# Patient Record
Sex: MALE | Race: WHITE
[De-identification: names, ages, dates, MRNs, and addresses within clinical notes are randomized per-mention and may not be internally consistent; named-entity substitution may affect disease eponyms.]

---

## 2017-08-16 PROBLEM — Z00.00 ENCOUNTER FOR PREVENTIVE HEALTH EXAMINATION: Status: ACTIVE | Noted: 2017-08-16

## 2017-09-06 ENCOUNTER — APPOINTMENT (OUTPATIENT)
Dept: ORTHOPEDIC SURGERY | Facility: CLINIC | Age: 62
End: 2017-09-06

## 2024-04-23 ENCOUNTER — OUTPATIENT (OUTPATIENT)
Dept: OUTPATIENT SERVICES | Facility: HOSPITAL | Age: 69
LOS: 1 days | Discharge: HOME | End: 2024-04-23

## 2024-04-23 ENCOUNTER — TRANSCRIPTION ENCOUNTER (OUTPATIENT)
Age: 69
End: 2024-04-23

## 2024-04-23 DIAGNOSIS — C44.42 SQUAMOUS CELL CARCINOMA OF SKIN OF SCALP AND NECK: ICD-10-CM

## 2024-04-29 PROBLEM — Z86.39 HISTORY OF HYPERLIPIDEMIA: Status: RESOLVED | Noted: 2024-04-29 | Resolved: 2024-04-29

## 2024-04-29 RX ORDER — LEVOTHYROXINE SODIUM 75 UG/1
75 TABLET ORAL
Refills: 0 | Status: ACTIVE | COMMUNITY

## 2024-04-29 RX ORDER — FEXOFENADINE HYDROCHLORIDE 180 MG/1
180 TABLET ORAL
Refills: 0 | Status: ACTIVE | COMMUNITY

## 2024-04-29 RX ORDER — ASPIRIN 81 MG
81 TABLET, DELAYED RELEASE (ENTERIC COATED) ORAL
Refills: 0 | Status: ACTIVE | COMMUNITY

## 2024-04-29 RX ORDER — SIMVASTATIN 40 MG/1
40 TABLET, FILM COATED ORAL
Refills: 0 | Status: ACTIVE | COMMUNITY

## 2024-04-29 RX ORDER — FLUTICASONE PROPIONATE 50 MCG
SPRAY, SUSPENSION NASAL
Refills: 0 | Status: ACTIVE | COMMUNITY

## 2024-04-29 RX ORDER — OMEPRAZOLE MAGNESIUM 40 MG/1
40 CAPSULE, DELAYED RELEASE ORAL
Refills: 0 | Status: ACTIVE | COMMUNITY

## 2024-04-29 RX ORDER — MULTIVIT-MIN/IRON/FOLIC ACID/K 18-600-40
500 CAPSULE ORAL
Refills: 0 | Status: ACTIVE | COMMUNITY

## 2024-04-29 RX ORDER — AZELASTINE HYDROCHLORIDE 137 UG/1
137 SPRAY, METERED NASAL
Refills: 0 | Status: ACTIVE | COMMUNITY

## 2024-04-30 ENCOUNTER — OUTPATIENT (OUTPATIENT)
Dept: OUTPATIENT SERVICES | Facility: HOSPITAL | Age: 69
LOS: 1 days | Discharge: HOME | End: 2024-04-30
Payer: MEDICARE

## 2024-04-30 DIAGNOSIS — Z87.19 PERSONAL HISTORY OF OTHER DISEASES OF THE DIGESTIVE SYSTEM: ICD-10-CM

## 2024-04-30 DIAGNOSIS — Z86.39 PERSONAL HISTORY OF OTHER ENDOCRINE, NUTRITIONAL AND METABOLIC DISEASE: ICD-10-CM

## 2024-05-07 ENCOUNTER — APPOINTMENT (OUTPATIENT)
Dept: RADIATION ONCOLOGY | Facility: CLINIC | Age: 69
End: 2024-05-07
Payer: MEDICARE

## 2024-05-07 VITALS
HEART RATE: 68 BPM | RESPIRATION RATE: 16 BRPM | DIASTOLIC BLOOD PRESSURE: 85 MMHG | WEIGHT: 166 LBS | OXYGEN SATURATION: 96 % | SYSTOLIC BLOOD PRESSURE: 143 MMHG

## 2024-05-07 DIAGNOSIS — Z78.9 OTHER SPECIFIED HEALTH STATUS: ICD-10-CM

## 2024-05-07 PROCEDURE — G2211 COMPLEX E/M VISIT ADD ON: CPT

## 2024-05-07 PROCEDURE — 99205 OFFICE O/P NEW HI 60 MIN: CPT

## 2024-05-07 NOTE — PHYSICAL EXAM
[Sclera] : the sclera and conjunctiva were normal [Extraocular Movements] : extraocular movements were intact [Outer Ear] : the ears and nose were normal in appearance [Bowel Sounds] : normal bowel sounds [Abdomen Soft] : soft [Nondistended] : nondistended [Abdomen Tenderness] : non-tender [Cervical Lymph Nodes Enlarged Posterior Bilaterally] : posterior cervical [Cervical Lymph Nodes Enlarged Anterior Bilaterally] : anterior cervical [Supraclavicular Lymph Nodes Enlarged Bilaterally] : supraclavicular [Axillary Lymph Nodes Enlarged Bilaterally] : axillary [Skin Color & Pigmentation] : normal skin color and pigmentation [Normal] : no focal deficits [de-identified] : well healed scar left neck [de-identified] : well-healing surgical incision

## 2024-05-07 NOTE — REVIEW OF SYSTEMS
[Negative] : Allergic/Immunologic [Dysphagia: Grade 0] : Dysphagia: Grade 0 [Edema Limbs: Grade 0] : Edema Limbs: Grade 0  [Fatigue: Grade 0] : Fatigue: Grade 0 [Localized Edema: Grade 0] : Localized Edema: Grade 0  [Neck Edema: Grade 0] : Neck Edema: Grade 0 [Dysgeusia: Grade 0] : Dysgeusia: Grade 0 [Voice Alteration: Grade 0] : Voice Alteration: Grade 0

## 2024-05-07 NOTE — HISTORY OF PRESENT ILLNESS
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  He presented to Dr. Flo López.  2/8/24 thyroid ultrasound noted a left neck enlarged lymph node with thickened cortex, measuring 2.5 x1.6 x 2.2 cm.  2/23/24 ultrasound-guided FNA of the left neck lymph node showed squamous cell carcinoma, p16 negative.  Features of a lymphoproliferative disorder were not seen.  3/8/24 PET/CT showed an intensely FDG avid left level IIa lymph node (SUV 13; measuring 1.9 x 1.7 cm).  No other focal abnormal FDG activity was identified.  He met with Dr. Erich Stewart.  3/19/24 MRI orbit face again noted an enlarged level IIA lymph node, with no additional enlarged cervical lymph nodes identified.  There was asymmetric mildly prominent soft tissue in the left vallecula/tongue base, nonspecific.  3/19/24 CT chest showed a stable benign intrapulmonary nodule along the left proximal interlobar fissure.  3/21/24 biopsy of the posterior vertex scalp showed actinic keratosis.  3/26/24 PET/CT showed a hypermetabolic left internal jugular lymph node level 2, with asymmetric hypermetabolic activity in ill-defined 1 cm area of nodularity of the left base of tongue, suspicious for occult head and neck malignancy.  4/19/24 biopsy of the lingual surface of the epiglottis and left base of tongue were negative.  Excision of the left cervical lymph node showed poorly differentiated squamous cell carcinoma, focally keratinized with necrosis.  p53 focally positive; suggestive of wild type.  p16 positive.  He will meet with Dr. Philip Berman on 5/8/24.  Recovering well post-procedure.  Currently, no xerostomia, dysgeusia, odynophagia, dysphagia, otalgia, mastication issues, neck pain, fatigue or weight loss.

## 2024-05-07 NOTE — LETTER CLOSING
[Consult Closing:] : Thank you for allowing me to participate in the care of this patient.  If you have any questions, please do not hesitate to contact me. [Sincerely yours,] : Sincerely yours, [FreeTextEntry3] : Richard Wharton MD  of Radiation Medicine, Quality and  of Radiation Medicine [facility]

## 2024-05-07 NOTE — DISEASE MANAGEMENT
[Clinical] : TNM Stage: c [FreeTextEntry4] : H&N squamous cell carcinoma, unknown primary, p16 positive. [TTNM] : 0 [NTNM] : 1 [MTNM] : 0 [III] : III

## 2024-05-08 ENCOUNTER — APPOINTMENT (OUTPATIENT)
Dept: HEMATOLOGY ONCOLOGY | Facility: CLINIC | Age: 69
End: 2024-05-08
Payer: MEDICARE

## 2024-05-08 ENCOUNTER — RESULT REVIEW (OUTPATIENT)
Age: 69
End: 2024-05-08

## 2024-05-08 VITALS
HEART RATE: 62 BPM | DIASTOLIC BLOOD PRESSURE: 93 MMHG | OXYGEN SATURATION: 98 % | WEIGHT: 169 LBS | BODY MASS INDEX: 28.16 KG/M2 | SYSTOLIC BLOOD PRESSURE: 147 MMHG | TEMPERATURE: 97.7 F | HEIGHT: 65 IN

## 2024-05-08 LAB
BASOPHILS # BLD AUTO: 0.1 K/UL — SIGNIFICANT CHANGE UP (ref 0–0.2)
BASOPHILS NFR BLD AUTO: 0.9 % — SIGNIFICANT CHANGE UP (ref 0–2)
EOSINOPHIL # BLD AUTO: 0.1 K/UL — SIGNIFICANT CHANGE UP (ref 0–0.5)
EOSINOPHIL NFR BLD AUTO: 1.8 % — SIGNIFICANT CHANGE UP (ref 0–6)
HCT VFR BLD CALC: 40.1 % — SIGNIFICANT CHANGE UP (ref 39–50)
HGB BLD-MCNC: 14 G/DL — SIGNIFICANT CHANGE UP (ref 13–17)
LYMPHOCYTES # BLD AUTO: 2 K/UL — SIGNIFICANT CHANGE UP (ref 1–3.3)
LYMPHOCYTES # BLD AUTO: 33.8 % — SIGNIFICANT CHANGE UP (ref 13–44)
MCHC RBC-ENTMCNC: 31.1 PG — SIGNIFICANT CHANGE UP (ref 27–34)
MCHC RBC-ENTMCNC: 34.9 G/DL — SIGNIFICANT CHANGE UP (ref 32–36)
MCV RBC AUTO: 89.1 FL — SIGNIFICANT CHANGE UP (ref 80–100)
MONOCYTES # BLD AUTO: 0.4 K/UL — SIGNIFICANT CHANGE UP (ref 0–0.9)
MONOCYTES NFR BLD AUTO: 7 % — SIGNIFICANT CHANGE UP (ref 2–14)
NEUTROPHILS # BLD AUTO: 3.4 K/UL — SIGNIFICANT CHANGE UP (ref 1.8–7.4)
NEUTROPHILS NFR BLD AUTO: 56.5 % — SIGNIFICANT CHANGE UP (ref 43–77)
PLATELET # BLD AUTO: 267 K/UL — SIGNIFICANT CHANGE UP (ref 150–400)
RBC # BLD: 4.5 M/UL — SIGNIFICANT CHANGE UP (ref 4.2–5.8)
RBC # FLD: 11.6 % — SIGNIFICANT CHANGE UP (ref 10.3–14.5)
WBC # BLD: 6 K/UL — SIGNIFICANT CHANGE UP (ref 3.8–10.5)
WBC # FLD AUTO: 6 K/UL — SIGNIFICANT CHANGE UP (ref 3.8–10.5)

## 2024-05-08 PROCEDURE — 99204 OFFICE O/P NEW MOD 45 MIN: CPT

## 2024-05-09 LAB
ALBUMIN SERPL ELPH-MCNC: 4.6 G/DL
ALP BLD-CCNC: 89 U/L
ALT SERPL-CCNC: 17 U/L
ANION GAP SERPL CALC-SCNC: 12 MMOL/L
APTT BLD: 30 SEC
AST SERPL-CCNC: 17 U/L
BILIRUB SERPL-MCNC: 0.4 MG/DL
BUN SERPL-MCNC: 21 MG/DL
CALCIUM SERPL-MCNC: 10.1 MG/DL
CHLORIDE SERPL-SCNC: 106 MMOL/L
CO2 SERPL-SCNC: 23 MMOL/L
CREAT SERPL-MCNC: 1.08 MG/DL
EGFR: 74 ML/MIN/1.73M2
GLUCOSE SERPL-MCNC: 105 MG/DL
HBV CORE IGG+IGM SER QL: NONREACTIVE
HBV SURFACE AB SER QL: NONREACTIVE
HBV SURFACE AG SER QL: NONREACTIVE
HCV AB SER QL: NONREACTIVE
HCV S/CO RATIO: 0.07 S/CO
INR PPP: 0.86 RATIO
MAGNESIUM SERPL-MCNC: 2.1 MG/DL
POTASSIUM SERPL-SCNC: 4.3 MMOL/L
PROT SERPL-MCNC: 6.8 G/DL
PT BLD: 9.8 SEC
SODIUM SERPL-SCNC: 141 MMOL/L

## 2024-05-09 NOTE — HISTORY OF PRESENT ILLNESS
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  He presented to Dr. Flo López.  2/8/24 thyroid ultrasound noted a left neck enlarged lymph node with thickened cortex, measuring 2.5 x 1.6 x 2.2 cm.  2/23/24 ultrasound-guided FNA of the left neck lymph node showed squamous cell carcinoma, p16 negative. Features of a lymphoproliferative disorder were not seen.  3/8/24 PET/CT showed an intensely FDG avid left level IIa lymph node (SUV 13; measuring 1.9 x 1.7 cm). No other focal abnormal FDG activity was identified.  He met with Dr. Erich Stewart.  3/19/24 MRI orbit face again noted an enlarged level IIA lymph node, with no additional enlarged cervical lymph nodes identified. There was asymmetric mildly prominent soft tissue in the left vallecula/tongue base, nonspecific.  3/19/24 CT chest showed a stable benign intrapulmonary nodule along the left proximal interlobar fissure.  3/21/24 biopsy of the posterior vertex scalp showed actinic keratosis.  3/26/24 PET/CT showed a hypermetabolic left internal jugular lymph node level 2, with asymmetric hypermetabolic activity in ill-defined 1 cm area of nodularity of the left base of tongue, suspicious for occult head and neck malignancy.  4/19/24 biopsy of the lingual surface of the epiglottis and left base of tongue were negative. Excision of the left cervical lymph node showed poorly differentiated squamous cell carcinoma, focally keratinized with necrosis. p53 focally positive; suggestive of wild type. p16 positive.    Patient presents for initial visit with wife Pt reports feeling well today. Denies symptoms, swallowing fine, denies pain Leading up to diagnosis, reported some trouble swallowing Denies recent weight loss Currently takes Synthroid for hypothyroidism Denies h/o kidney dysfunction Wife reports that pt has been hospitalized in the past for dehydration Wife is concerned about side effects related to treatment

## 2024-05-09 NOTE — ADDENDUM
[FreeTextEntry1] : Documented by Ramana Downing acting as scribe for Dr. Berman on  05/08/2024.   All Medical record entries made by the Scribe were at my, Dr. Berman's, direction and personally dictated by me on  05/08/2024. I have reviewed the chart and agree that the record accurately reflects my personal performance of the history, physical exam, assessment and plan. I have also personally directed, reviewed, and agreed with the discharge instructions.

## 2024-05-09 NOTE — ASSESSMENT
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 (T0N1M0) p16+  Today we discussed the available radiographic and pathologic data in detail. We also discussed the natural history of the disease, as well as management options.  Discussed treatment with concurrent chemo/RT with cisplatin 40 mg/m2 once a week for 6 cycles, as well as the side effect profile. Discussed that the goal of treatment is curative.  Chemo consent form signed.  Plan -Start concurrent chemo/RT  -Continue f/u with Dr. Akiko MCKEON after tx has started

## 2024-05-10 ENCOUNTER — NON-APPOINTMENT (OUTPATIENT)
Age: 69
End: 2024-05-10

## 2024-05-13 ENCOUNTER — TRANSCRIPTION ENCOUNTER (OUTPATIENT)
Age: 69
End: 2024-05-13

## 2024-05-14 ENCOUNTER — NON-APPOINTMENT (OUTPATIENT)
Age: 69
End: 2024-05-14

## 2024-05-14 DIAGNOSIS — Z86.39 PERSONAL HISTORY OF OTHER ENDOCRINE, NUTRITIONAL AND METABOLIC DISEASE: ICD-10-CM

## 2024-05-15 ENCOUNTER — NON-APPOINTMENT (OUTPATIENT)
Age: 69
End: 2024-05-15

## 2024-05-20 DIAGNOSIS — C76.0 MALIGNANT NEOPLASM OF HEAD, FACE AND NECK: ICD-10-CM

## 2024-05-28 ENCOUNTER — APPOINTMENT (OUTPATIENT)
Dept: RADIATION ONCOLOGY | Facility: CLINIC | Age: 69
End: 2024-05-28
Payer: MEDICARE

## 2024-05-28 VITALS
RESPIRATION RATE: 16 BRPM | BODY MASS INDEX: 28.12 KG/M2 | OXYGEN SATURATION: 97 % | WEIGHT: 169 LBS | SYSTOLIC BLOOD PRESSURE: 135 MMHG | DIASTOLIC BLOOD PRESSURE: 82 MMHG | HEART RATE: 93 BPM

## 2024-05-28 PROCEDURE — 77470 SPECIAL RADIATION TREATMENT: CPT | Mod: 26

## 2024-05-28 PROCEDURE — G2211 COMPLEX E/M VISIT ADD ON: CPT

## 2024-05-28 PROCEDURE — 77334 RADIATION TREATMENT AID(S): CPT | Mod: 26

## 2024-05-28 PROCEDURE — 77263 THER RADIOLOGY TX PLNG CPLX: CPT

## 2024-05-28 PROCEDURE — 99214 OFFICE O/P EST MOD 30 MIN: CPT | Mod: 25

## 2024-05-29 NOTE — VITALS
[Maximal Pain Intensity: 0/10] : 0/10 [Least Pain Intensity: 0/10] : 0/10 [NoTreatment Scheduled] : no treatment scheduled [ECOG Performance Status: 1 - Restricted in physically strenuous activity but ambulatory and able to carry out work of a light or sedentary nature] : Performance Status: 1 - Restricted in physically strenuous activity but ambulatory and able to carry out work of a light or sedentary nature, e.g., light house work, office work [90: Able to carry normal activity; minor signs or symptoms of disease.] : 90: Able to carry normal activity; minor signs or symptoms of disease.

## 2024-05-29 NOTE — HISTORY OF PRESENT ILLNESS
[FreeTextEntry1] : 68-year-old man presents today for follow-up and to discuss concurrent chemo radiation therapy.  He is accompanied by his wife Leighann.  He is diagnosed with squamous cell carcinoma of a left cervical lymph node of unknown primary.  2/8/24 thyroid ultrasound noted a left neck enlarged lymph node with thickened cortex, measuring 2.5 x1.6 x 2.2 cm.  2/23/24 ultrasound-guided FNA of the left neck lymph node showed squamous cell carcinoma, p16 negative. Features of a lymphoproliferative disorder were not seen.  3/8/24 PET/CT showed an intensely FDG avid left level IIa lymph node (SUV 13; measuring 1.9 x 1.7 cm). No other focal abnormal FDG activity was identified.  He met with Dr. Erich Stewart.  3/19/24 MRI orbit face again noted an enlarged level IIA lymph node, with no additional enlarged cervical lymph nodes identified. There was asymmetric mildly prominent soft tissue in the left vallecula/tongue base, nonspecific.  3/19/24 CT chest showed a stable benign intrapulmonary nodule along the left proximal interlobar fissure.  3/26/24 PET/CT showed a hypermetabolic left internal jugular lymph node level 2, with asymmetric hypermetabolic activity in ill-defined 1 cm area of nodularity of the left base of tongue, suspicious for occult head and neck malignancy.  4/19/24 biopsy of the lingual surface of the epiglottis and left base of tongue were negative. Excision of the left cervical lymph node showed poorly differentiated squamous cell carcinoma, focally keratinized with necrosis. p53 focally positive; suggestive of wild type. p16 positive.  He met with Dr. Philip Berman on 5/8/24.  He also recently saw the dentist, and underwent dental implant of a front upper tooth.  Mr. Celeste denies xerostomia, dysgeusia, odynophagia, dysphagia, otalgia, mastication issues, neck pain, fatigue or weight loss.

## 2024-06-05 ENCOUNTER — OUTPATIENT (OUTPATIENT)
Dept: OUTPATIENT SERVICES | Facility: HOSPITAL | Age: 69
LOS: 1 days | End: 2024-06-05
Payer: MEDICARE

## 2024-06-05 ENCOUNTER — APPOINTMENT (OUTPATIENT)
Dept: NUCLEAR MEDICINE | Facility: CLINIC | Age: 69
End: 2024-06-05

## 2024-06-05 DIAGNOSIS — C79.89 SECONDARY MALIGNANT NEOPLASM OF OTHER SPECIFIED SITES: ICD-10-CM

## 2024-06-05 PROCEDURE — 78815 PET IMAGE W/CT SKULL-THIGH: CPT | Mod: 26,PS

## 2024-06-06 PROCEDURE — 77290 THER RAD SIMULAJ FIELD CPLX: CPT | Mod: 26

## 2024-06-07 PROCEDURE — 77338 DESIGN MLC DEVICE FOR IMRT: CPT | Mod: 26

## 2024-06-07 PROCEDURE — 77301 RADIOTHERAPY DOSE PLAN IMRT: CPT | Mod: 26

## 2024-06-07 PROCEDURE — 77300 RADIATION THERAPY DOSE PLAN: CPT | Mod: 26

## 2024-06-11 RX ORDER — PROCHLORPERAZINE MALEATE 10 MG/1
10 TABLET ORAL
Qty: 120 | Refills: 2 | Status: ACTIVE | COMMUNITY
Start: 2024-06-11 | End: 1900-01-01

## 2024-06-11 RX ORDER — ONDANSETRON 8 MG/1
8 TABLET ORAL
Qty: 90 | Refills: 2 | Status: ACTIVE | COMMUNITY
Start: 2024-06-11 | End: 1900-01-01

## 2024-06-13 ENCOUNTER — RESULT REVIEW (OUTPATIENT)
Age: 69
End: 2024-06-13

## 2024-06-13 ENCOUNTER — APPOINTMENT (OUTPATIENT)
Dept: HEMATOLOGY ONCOLOGY | Facility: CLINIC | Age: 69
End: 2024-06-13

## 2024-06-13 LAB
BASOPHILS # BLD AUTO: 0 K/UL — SIGNIFICANT CHANGE UP (ref 0–0.2)
BASOPHILS NFR BLD AUTO: 0.7 % — SIGNIFICANT CHANGE UP (ref 0–2)
EOSINOPHIL # BLD AUTO: 0.1 K/UL — SIGNIFICANT CHANGE UP (ref 0–0.5)
EOSINOPHIL NFR BLD AUTO: 2.3 % — SIGNIFICANT CHANGE UP (ref 0–6)
HCT VFR BLD CALC: 39.7 % — SIGNIFICANT CHANGE UP (ref 39–50)
HGB BLD-MCNC: 13.8 G/DL — SIGNIFICANT CHANGE UP (ref 13–17)
LYMPHOCYTES # BLD AUTO: 1.7 K/UL — SIGNIFICANT CHANGE UP (ref 1–3.3)
LYMPHOCYTES # BLD AUTO: 30.1 % — SIGNIFICANT CHANGE UP (ref 13–44)
MCHC RBC-ENTMCNC: 31 PG — SIGNIFICANT CHANGE UP (ref 27–34)
MCHC RBC-ENTMCNC: 34.9 G/DL — SIGNIFICANT CHANGE UP (ref 32–36)
MCV RBC AUTO: 88.8 FL — SIGNIFICANT CHANGE UP (ref 80–100)
MONOCYTES # BLD AUTO: 0.3 K/UL — SIGNIFICANT CHANGE UP (ref 0–0.9)
MONOCYTES NFR BLD AUTO: 6 % — SIGNIFICANT CHANGE UP (ref 2–14)
NEUTROPHILS # BLD AUTO: 3.3 K/UL — SIGNIFICANT CHANGE UP (ref 1.8–7.4)
NEUTROPHILS NFR BLD AUTO: 60.9 % — SIGNIFICANT CHANGE UP (ref 43–77)
PLATELET # BLD AUTO: 236 K/UL — SIGNIFICANT CHANGE UP (ref 150–400)
RBC # BLD: 4.47 M/UL — SIGNIFICANT CHANGE UP (ref 4.2–5.8)
RBC # FLD: 11.1 % — SIGNIFICANT CHANGE UP (ref 10.3–14.5)
WBC # BLD: 5.5 K/UL — SIGNIFICANT CHANGE UP (ref 3.8–10.5)
WBC # FLD AUTO: 5.5 K/UL — SIGNIFICANT CHANGE UP (ref 3.8–10.5)

## 2024-06-14 ENCOUNTER — LABORATORY RESULT (OUTPATIENT)
Age: 69
End: 2024-06-14

## 2024-06-14 ENCOUNTER — APPOINTMENT (OUTPATIENT)
Age: 69
End: 2024-06-14

## 2024-06-14 LAB
ALBUMIN SERPL ELPH-MCNC: 4.3 G/DL
ALP BLD-CCNC: 74 U/L
ALT SERPL-CCNC: 17 U/L
ANION GAP SERPL CALC-SCNC: 11 MMOL/L
AST SERPL-CCNC: 17 U/L
BILIRUB SERPL-MCNC: 0.4 MG/DL
BUN SERPL-MCNC: 22 MG/DL
CALCIUM SERPL-MCNC: 9.9 MG/DL
CHLORIDE SERPL-SCNC: 108 MMOL/L
CO2 SERPL-SCNC: 24 MMOL/L
CREAT SERPL-MCNC: 1.39 MG/DL
EGFR: 55 ML/MIN/1.73M2
GLUCOSE SERPL-MCNC: 140 MG/DL
MAGNESIUM SERPL-MCNC: 2 MG/DL
POTASSIUM SERPL-SCNC: 4 MMOL/L
PROT SERPL-MCNC: 6.5 G/DL
SODIUM SERPL-SCNC: 143 MMOL/L

## 2024-06-14 PROCEDURE — 77387B: CUSTOM | Mod: 26

## 2024-06-14 PROCEDURE — 77427 RADIATION TX MANAGEMENT X5: CPT

## 2024-06-16 ENCOUNTER — NON-APPOINTMENT (OUTPATIENT)
Age: 69
End: 2024-06-16

## 2024-06-17 DIAGNOSIS — Z51.11 ENCOUNTER FOR ANTINEOPLASTIC CHEMOTHERAPY: ICD-10-CM

## 2024-06-17 DIAGNOSIS — E86.0 DEHYDRATION: ICD-10-CM

## 2024-06-17 DIAGNOSIS — R11.2 NAUSEA WITH VOMITING, UNSPECIFIED: ICD-10-CM

## 2024-06-17 PROCEDURE — 77387B: CUSTOM | Mod: 26

## 2024-06-18 PROCEDURE — 77387B: CUSTOM | Mod: 26

## 2024-06-19 PROCEDURE — 77387B: CUSTOM | Mod: 26

## 2024-06-20 ENCOUNTER — NON-APPOINTMENT (OUTPATIENT)
Age: 69
End: 2024-06-20

## 2024-06-20 ENCOUNTER — APPOINTMENT (OUTPATIENT)
Dept: HEMATOLOGY ONCOLOGY | Facility: CLINIC | Age: 69
End: 2024-06-20

## 2024-06-20 ENCOUNTER — RESULT REVIEW (OUTPATIENT)
Age: 69
End: 2024-06-20

## 2024-06-20 VITALS
RESPIRATION RATE: 16 BRPM | HEART RATE: 63 BPM | SYSTOLIC BLOOD PRESSURE: 143 MMHG | DIASTOLIC BLOOD PRESSURE: 83 MMHG | BODY MASS INDEX: 27.99 KG/M2 | OXYGEN SATURATION: 98 % | WEIGHT: 168 LBS | HEIGHT: 65 IN

## 2024-06-20 LAB
BASOPHILS # BLD AUTO: 0 K/UL — SIGNIFICANT CHANGE UP (ref 0–0.2)
BASOPHILS NFR BLD AUTO: 0.7 % — SIGNIFICANT CHANGE UP (ref 0–2)
EOSINOPHIL # BLD AUTO: 0.2 K/UL — SIGNIFICANT CHANGE UP (ref 0–0.5)
EOSINOPHIL NFR BLD AUTO: 2.4 % — SIGNIFICANT CHANGE UP (ref 0–6)
HCT VFR BLD CALC: 41.5 % — SIGNIFICANT CHANGE UP (ref 39–50)
HGB BLD-MCNC: 14.5 G/DL — SIGNIFICANT CHANGE UP (ref 13–17)
LYMPHOCYTES # BLD AUTO: 1.6 K/UL — SIGNIFICANT CHANGE UP (ref 1–3.3)
LYMPHOCYTES # BLD AUTO: 22.6 % — SIGNIFICANT CHANGE UP (ref 13–44)
MCHC RBC-ENTMCNC: 30.8 PG — SIGNIFICANT CHANGE UP (ref 27–34)
MCHC RBC-ENTMCNC: 34.9 G/DL — SIGNIFICANT CHANGE UP (ref 32–36)
MCV RBC AUTO: 88.4 FL — SIGNIFICANT CHANGE UP (ref 80–100)
MONOCYTES # BLD AUTO: 0.4 K/UL — SIGNIFICANT CHANGE UP (ref 0–0.9)
MONOCYTES NFR BLD AUTO: 6.4 % — SIGNIFICANT CHANGE UP (ref 2–14)
NEUTROPHILS # BLD AUTO: 4.8 K/UL — SIGNIFICANT CHANGE UP (ref 1.8–7.4)
NEUTROPHILS NFR BLD AUTO: 68 % — SIGNIFICANT CHANGE UP (ref 43–77)
PLATELET # BLD AUTO: 261 K/UL — SIGNIFICANT CHANGE UP (ref 150–400)
RBC # BLD: 4.7 M/UL — SIGNIFICANT CHANGE UP (ref 4.2–5.8)
RBC # FLD: 10.9 % — SIGNIFICANT CHANGE UP (ref 10.3–14.5)
WBC # BLD: 7 K/UL — SIGNIFICANT CHANGE UP (ref 3.8–10.5)
WBC # FLD AUTO: 7 K/UL — SIGNIFICANT CHANGE UP (ref 3.8–10.5)

## 2024-06-20 PROCEDURE — 77014: CPT | Mod: 26

## 2024-06-20 NOTE — REVIEW OF SYSTEMS
[Dysphagia: Grade 0] : Dysphagia: Grade 0 [Edema Limbs: Grade 0] : Edema Limbs: Grade 0  [Fatigue: Grade 0] : Fatigue: Grade 0 [Localized Edema: Grade 0] : Localized Edema: Grade 0  [Neck Edema: Grade 0] : Neck Edema: Grade 0 [Mucositis Oral: Grade 0] : Mucositis Oral: Grade 0  [Xerostomia: Grade 0] : Xerostomia: Grade 0 [Oral Pain: Grade 0] : Oral Pain: Grade 0 [Dysgeusia: Grade 0] : Dysgeusia: Grade 0 [Hoarseness: Grade 0] : Hoarseness: Grade 0 [Voice Alteration: Grade 0] : Voice Alteration: Grade 0 [Skin Hyperpigmentation: Grade 0] : Skin Hyperpigmentation: Grade 0 [Dermatitis Radiation: Grade 0] : Dermatitis Radiation: Grade 0

## 2024-06-20 NOTE — DISEASE MANAGEMENT
[Clinical] : TNM Stage: c [III] : III [FreeTextEntry4] : H&N squamous cell carcinoma, unknown primary, p16 positive. [TTNM] : 0 [NTNM] : 1 [MTNM] : 0 [de-identified] : 1000 [de-identified] : 4339 [de-identified] : pharynx

## 2024-06-21 ENCOUNTER — APPOINTMENT (OUTPATIENT)
Age: 69
End: 2024-06-21

## 2024-06-21 ENCOUNTER — OUTPATIENT (OUTPATIENT)
Dept: OUTPATIENT SERVICES | Facility: HOSPITAL | Age: 69
LOS: 1 days | Discharge: HOME | End: 2024-06-21

## 2024-06-21 DIAGNOSIS — C44.42 SQUAMOUS CELL CARCINOMA OF SKIN OF SCALP AND NECK: ICD-10-CM

## 2024-06-21 LAB
ALBUMIN SERPL ELPH-MCNC: 4.4 G/DL
ALP BLD-CCNC: 74 U/L
ALT SERPL-CCNC: 20 U/L
ANION GAP SERPL CALC-SCNC: 13 MMOL/L
AST SERPL-CCNC: 18 U/L
BILIRUB SERPL-MCNC: 0.5 MG/DL
BUN SERPL-MCNC: 20 MG/DL
CALCIUM SERPL-MCNC: 10.4 MG/DL
CHLORIDE SERPL-SCNC: 104 MMOL/L
CO2 SERPL-SCNC: 22 MMOL/L
CREAT SERPL-MCNC: 0.96 MG/DL
EGFR: 84 ML/MIN/1.73M2
GLUCOSE SERPL-MCNC: 152 MG/DL
POTASSIUM SERPL-SCNC: 4.2 MMOL/L
PROT SERPL-MCNC: 6.6 G/DL
SODIUM SERPL-SCNC: 139 MMOL/L

## 2024-06-21 PROCEDURE — 77427 RADIATION TX MANAGEMENT X5: CPT

## 2024-06-21 PROCEDURE — 77387B: CUSTOM | Mod: 26

## 2024-06-24 DIAGNOSIS — Z51.11 ENCOUNTER FOR ANTINEOPLASTIC CHEMOTHERAPY: ICD-10-CM

## 2024-06-24 DIAGNOSIS — R11.2 NAUSEA WITH VOMITING, UNSPECIFIED: ICD-10-CM

## 2024-06-24 PROCEDURE — 77387B: CUSTOM | Mod: 26

## 2024-06-25 PROCEDURE — 77387B: CUSTOM | Mod: 26

## 2024-06-26 ENCOUNTER — RESULT REVIEW (OUTPATIENT)
Age: 69
End: 2024-06-26

## 2024-06-26 ENCOUNTER — APPOINTMENT (OUTPATIENT)
Dept: HEMATOLOGY ONCOLOGY | Facility: CLINIC | Age: 69
End: 2024-06-26

## 2024-06-26 ENCOUNTER — APPOINTMENT (OUTPATIENT)
Dept: HEMATOLOGY ONCOLOGY | Facility: CLINIC | Age: 69
End: 2024-06-26
Payer: MEDICARE

## 2024-06-26 VITALS
HEART RATE: 65 BPM | HEIGHT: 65 IN | SYSTOLIC BLOOD PRESSURE: 126 MMHG | BODY MASS INDEX: 27.83 KG/M2 | DIASTOLIC BLOOD PRESSURE: 80 MMHG | OXYGEN SATURATION: 97 % | WEIGHT: 167.06 LBS

## 2024-06-26 DIAGNOSIS — C80.1 SECONDARY MALIGNANT NEOPLASM OF OTHER SPECIFIED SITES: ICD-10-CM

## 2024-06-26 DIAGNOSIS — C79.89 SECONDARY MALIGNANT NEOPLASM OF OTHER SPECIFIED SITES: ICD-10-CM

## 2024-06-26 LAB
BASOPHILS # BLD AUTO: 0 K/UL — SIGNIFICANT CHANGE UP (ref 0–0.2)
BASOPHILS NFR BLD AUTO: 0.4 % — SIGNIFICANT CHANGE UP (ref 0–2)
EOSINOPHIL # BLD AUTO: 0.1 K/UL — SIGNIFICANT CHANGE UP (ref 0–0.5)
EOSINOPHIL NFR BLD AUTO: 1.5 % — SIGNIFICANT CHANGE UP (ref 0–6)
HCT VFR BLD CALC: 39.5 % — SIGNIFICANT CHANGE UP (ref 39–50)
HGB BLD-MCNC: 13.8 G/DL — SIGNIFICANT CHANGE UP (ref 13–17)
LYMPHOCYTES # BLD AUTO: 1.1 K/UL — SIGNIFICANT CHANGE UP (ref 1–3.3)
LYMPHOCYTES # BLD AUTO: 17 % — SIGNIFICANT CHANGE UP (ref 13–44)
MCHC RBC-ENTMCNC: 31.2 PG — SIGNIFICANT CHANGE UP (ref 27–34)
MCHC RBC-ENTMCNC: 34.8 G/DL — SIGNIFICANT CHANGE UP (ref 32–36)
MCV RBC AUTO: 89.6 FL — SIGNIFICANT CHANGE UP (ref 80–100)
MONOCYTES # BLD AUTO: 0.5 K/UL — SIGNIFICANT CHANGE UP (ref 0–0.9)
MONOCYTES NFR BLD AUTO: 8.7 % — SIGNIFICANT CHANGE UP (ref 2–14)
NEUTROPHILS # BLD AUTO: 4.5 K/UL — SIGNIFICANT CHANGE UP (ref 1.8–7.4)
NEUTROPHILS NFR BLD AUTO: 72.4 % — SIGNIFICANT CHANGE UP (ref 43–77)
PLATELET # BLD AUTO: 225 K/UL — SIGNIFICANT CHANGE UP (ref 150–400)
RBC # BLD: 4.41 M/UL — SIGNIFICANT CHANGE UP (ref 4.2–5.8)
RBC # FLD: 11.5 % — SIGNIFICANT CHANGE UP (ref 10.3–14.5)
WBC # BLD: 6.2 K/UL — SIGNIFICANT CHANGE UP (ref 3.8–10.5)
WBC # FLD AUTO: 6.2 K/UL — SIGNIFICANT CHANGE UP (ref 3.8–10.5)

## 2024-06-26 PROCEDURE — 99214 OFFICE O/P EST MOD 30 MIN: CPT

## 2024-06-26 PROCEDURE — 77387B: CUSTOM | Mod: 26

## 2024-06-27 ENCOUNTER — NON-APPOINTMENT (OUTPATIENT)
Age: 69
End: 2024-06-27

## 2024-06-27 VITALS
RESPIRATION RATE: 16 BRPM | SYSTOLIC BLOOD PRESSURE: 127 MMHG | OXYGEN SATURATION: 96 % | HEART RATE: 90 BPM | TEMPERATURE: 97 F | WEIGHT: 168 LBS | BODY MASS INDEX: 27.99 KG/M2 | DIASTOLIC BLOOD PRESSURE: 77 MMHG | HEIGHT: 65 IN

## 2024-06-27 DIAGNOSIS — R07.0 PAIN IN THROAT: ICD-10-CM

## 2024-06-27 LAB
ALBUMIN SERPL ELPH-MCNC: 4.4 G/DL
ALP BLD-CCNC: 78 U/L
ALT SERPL-CCNC: 22 U/L
ANION GAP SERPL CALC-SCNC: 11 MMOL/L
AST SERPL-CCNC: 16 U/L
BILIRUB SERPL-MCNC: 0.3 MG/DL
BUN SERPL-MCNC: 25 MG/DL
CALCIUM SERPL-MCNC: 10.1 MG/DL
CHLORIDE SERPL-SCNC: 104 MMOL/L
CO2 SERPL-SCNC: 25 MMOL/L
CREAT SERPL-MCNC: 1.05 MG/DL
EGFR: 76 ML/MIN/1.73M2
GLUCOSE SERPL-MCNC: 84 MG/DL
POTASSIUM SERPL-SCNC: 4.6 MMOL/L
PROT SERPL-MCNC: 6.7 G/DL
SODIUM SERPL-SCNC: 140 MMOL/L

## 2024-06-27 PROCEDURE — 77014: CPT | Mod: 26

## 2024-06-27 NOTE — DISEASE MANAGEMENT
[Clinical] : TNM Stage: c [III] : III [FreeTextEntry4] : H&N squamous cell carcinoma, unknown primary, p16 positive. [TTNM] : 0 [NTNM] : 1 [MTNM] : 0 [de-identified] : 2000 [de-identified] : 4455 [de-identified] : pharynx

## 2024-06-27 NOTE — VITALS
[Maximal Pain Intensity: 0/10] : 0/10 [Least Pain Intensity: 0/10] : 0/10 [Maximal Pain Intensity: 1/10] : 1/10 [Least Pain Intensity: 1/10] : 1/10 [Pain Description/Quality: ___] : Pain description/quality: [unfilled] [Pain Duration: ___] : Pain duration: [unfilled] [Pain Location: ___] : Pain Location: [unfilled] [NoTreatment Scheduled] : no treatment scheduled [Pain Interferes with ADLs] : Pain does not interfere with activities of daily living [90: Able to carry normal activity; minor signs or symptoms of disease.] : 90: Able to carry normal activity; minor signs or symptoms of disease.

## 2024-06-27 NOTE — REVIEW OF SYSTEMS
[Dysphagia: Grade 0] : Dysphagia: Grade 0 [Edema Limbs: Grade 0] : Edema Limbs: Grade 0  [Fatigue: Grade 0] : Fatigue: Grade 0 [Localized Edema: Grade 0] : Localized Edema: Grade 0  [Neck Edema: Grade 0] : Neck Edema: Grade 0 [Mucositis Oral: Grade 0] : Mucositis Oral: Grade 0  [Xerostomia: Grade 0] : Xerostomia: Grade 0 [Oral Pain: Grade 0] : Oral Pain: Grade 0 [Dysgeusia: Grade 0] : Dysgeusia: Grade 0 [Hoarseness: Grade 0] : Hoarseness: Grade 0 [Voice Alteration: Grade 0] : Voice Alteration: Grade 0 [Skin Hyperpigmentation: Grade 0] : Skin Hyperpigmentation: Grade 0 [Dermatitis Radiation: Grade 0] : Dermatitis Radiation: Grade 0 [Fatigue: Grade 1 - Fatigue relieved by rest] : Fatigue: Grade 1 - Fatigue relieved by rest [Dysgeusia: Grade 1- Altered taste but no change in diet] : Dysgeusia: Grade 1 - Altered taste but no change in diet

## 2024-06-28 ENCOUNTER — APPOINTMENT (OUTPATIENT)
Age: 69
End: 2024-06-28

## 2024-06-28 PROCEDURE — 77387B: CUSTOM | Mod: 26

## 2024-07-01 DIAGNOSIS — E86.0 DEHYDRATION: ICD-10-CM

## 2024-07-01 PROCEDURE — 77387B: CUSTOM | Mod: 26

## 2024-07-01 PROCEDURE — 77427 RADIATION TX MANAGEMENT X5: CPT

## 2024-07-02 PROCEDURE — 77387B: CUSTOM | Mod: 26

## 2024-07-03 ENCOUNTER — APPOINTMENT (OUTPATIENT)
Dept: HEMATOLOGY ONCOLOGY | Facility: CLINIC | Age: 69
End: 2024-07-03

## 2024-07-03 ENCOUNTER — RESULT REVIEW (OUTPATIENT)
Age: 69
End: 2024-07-03

## 2024-07-03 LAB
ALBUMIN SERPL ELPH-MCNC: 4.2 G/DL
ALP BLD-CCNC: 73 U/L
ALT SERPL-CCNC: 14 U/L
ANION GAP SERPL CALC-SCNC: 14 MMOL/L
AST SERPL-CCNC: 13 U/L
BASOPHILS # BLD AUTO: 0.1 K/UL — SIGNIFICANT CHANGE UP (ref 0–0.2)
BASOPHILS NFR BLD AUTO: 1.3 % — SIGNIFICANT CHANGE UP (ref 0–2)
BILIRUB SERPL-MCNC: 0.3 MG/DL
BUN SERPL-MCNC: 25 MG/DL
CALCIUM SERPL-MCNC: 10.1 MG/DL
CHLORIDE SERPL-SCNC: 104 MMOL/L
CO2 SERPL-SCNC: 26 MMOL/L
CREAT SERPL-MCNC: 1.07 MG/DL
EGFR: 74 ML/MIN/1.73M2
EOSINOPHIL # BLD AUTO: 0.1 K/UL — SIGNIFICANT CHANGE UP (ref 0–0.5)
EOSINOPHIL NFR BLD AUTO: 2.7 % — SIGNIFICANT CHANGE UP (ref 0–6)
GLUCOSE SERPL-MCNC: 103 MG/DL
HCT VFR BLD CALC: 39.1 % — SIGNIFICANT CHANGE UP (ref 39–50)
HGB BLD-MCNC: 13 G/DL — SIGNIFICANT CHANGE UP (ref 13–17)
LYMPHOCYTES # BLD AUTO: 0.5 K/UL — LOW (ref 1–3.3)
LYMPHOCYTES # BLD AUTO: 10.2 % — LOW (ref 13–44)
MCHC RBC-ENTMCNC: 31.2 PG — SIGNIFICANT CHANGE UP (ref 27–34)
MCHC RBC-ENTMCNC: 33.4 G/DL — SIGNIFICANT CHANGE UP (ref 32–36)
MCV RBC AUTO: 93.4 FL — SIGNIFICANT CHANGE UP (ref 80–100)
MONOCYTES # BLD AUTO: 0.4 K/UL — SIGNIFICANT CHANGE UP (ref 0–0.9)
MONOCYTES NFR BLD AUTO: 8.8 % — SIGNIFICANT CHANGE UP (ref 2–14)
NEUTROPHILS # BLD AUTO: 3.8 K/UL — SIGNIFICANT CHANGE UP (ref 1.8–7.4)
NEUTROPHILS NFR BLD AUTO: 77 % — SIGNIFICANT CHANGE UP (ref 43–77)
PLATELET # BLD AUTO: 209 K/UL — SIGNIFICANT CHANGE UP (ref 150–400)
POTASSIUM SERPL-SCNC: 4.5 MMOL/L
PROT SERPL-MCNC: 6.4 G/DL
RBC # BLD: 4.18 M/UL — LOW (ref 4.2–5.8)
RBC # FLD: 11.8 % — SIGNIFICANT CHANGE UP (ref 10.3–14.5)
SODIUM SERPL-SCNC: 143 MMOL/L
WBC # BLD: 4.9 K/UL — SIGNIFICANT CHANGE UP (ref 3.8–10.5)
WBC # FLD AUTO: 4.9 K/UL — SIGNIFICANT CHANGE UP (ref 3.8–10.5)

## 2024-07-03 PROCEDURE — 77387B: CUSTOM | Mod: 26

## 2024-07-05 ENCOUNTER — RESULT REVIEW (OUTPATIENT)
Age: 69
End: 2024-07-05

## 2024-07-05 ENCOUNTER — NON-APPOINTMENT (OUTPATIENT)
Age: 69
End: 2024-07-05

## 2024-07-05 ENCOUNTER — APPOINTMENT (OUTPATIENT)
Age: 69
End: 2024-07-05

## 2024-07-05 VITALS
HEART RATE: 62 BPM | WEIGHT: 167.55 LBS | DIASTOLIC BLOOD PRESSURE: 76 MMHG | TEMPERATURE: 98 F | BODY MASS INDEX: 27.88 KG/M2 | RESPIRATION RATE: 16 BRPM | SYSTOLIC BLOOD PRESSURE: 112 MMHG | OXYGEN SATURATION: 98 %

## 2024-07-05 LAB
ALBUMIN SERPL ELPH-MCNC: 4 G/DL — SIGNIFICANT CHANGE UP (ref 3.3–5)
ALP SERPL-CCNC: 63 U/L — SIGNIFICANT CHANGE UP (ref 40–120)
ALT FLD-CCNC: 18 U/L — SIGNIFICANT CHANGE UP (ref 10–45)
ANION GAP SERPL CALC-SCNC: 12 MMOL/L — SIGNIFICANT CHANGE UP (ref 5–17)
AST SERPL-CCNC: 17 U/L — SIGNIFICANT CHANGE UP (ref 10–40)
BILIRUB SERPL-MCNC: 0.3 MG/DL — SIGNIFICANT CHANGE UP (ref 0.2–1.2)
BUN SERPL-MCNC: 21 MG/DL — SIGNIFICANT CHANGE UP (ref 7–23)
CALCIUM SERPL-MCNC: 9.9 MG/DL — SIGNIFICANT CHANGE UP (ref 8.4–10.5)
CHLORIDE SERPL-SCNC: 103 MMOL/L — SIGNIFICANT CHANGE UP (ref 96–108)
CO2 SERPL-SCNC: 24 MMOL/L — SIGNIFICANT CHANGE UP (ref 22–31)
CREAT SERPL-MCNC: 0.98 MG/DL — SIGNIFICANT CHANGE UP (ref 0.5–1.3)
EGFR: 82 ML/MIN/1.73M2 — SIGNIFICANT CHANGE UP
GLUCOSE SERPL-MCNC: 122 MG/DL — HIGH (ref 70–99)
MAGNESIUM SERPL-MCNC: 1.8 MG/DL — SIGNIFICANT CHANGE UP (ref 1.6–2.6)
POTASSIUM SERPL-MCNC: 4.5 MMOL/L — SIGNIFICANT CHANGE UP (ref 3.5–5.3)
POTASSIUM SERPL-SCNC: 4.5 MMOL/L — SIGNIFICANT CHANGE UP (ref 3.5–5.3)
PROT SERPL-MCNC: 6.5 G/DL — SIGNIFICANT CHANGE UP (ref 6–8.3)
SODIUM SERPL-SCNC: 139 MMOL/L — SIGNIFICANT CHANGE UP (ref 135–145)

## 2024-07-05 PROCEDURE — 77014: CPT | Mod: 26

## 2024-07-05 RX ORDER — DIPHENHYDRAMINE HYDROCHLORIDE AND LIDOCAINE HYDROCHLORIDE AND ALUMINUM HYDROXIDE AND MAGNESIUM HYDRO
KIT
Qty: 300 | Refills: 3 | Status: ACTIVE | COMMUNITY
Start: 2024-07-05 | End: 1900-01-01

## 2024-07-08 PROCEDURE — 77387B: CUSTOM | Mod: 26

## 2024-07-08 PROCEDURE — 77427 RADIATION TX MANAGEMENT X5: CPT

## 2024-07-09 PROCEDURE — 77387B: CUSTOM | Mod: 26

## 2024-07-10 ENCOUNTER — RESULT REVIEW (OUTPATIENT)
Age: 69
End: 2024-07-10

## 2024-07-10 ENCOUNTER — APPOINTMENT (OUTPATIENT)
Dept: HEMATOLOGY ONCOLOGY | Facility: CLINIC | Age: 69
End: 2024-07-10
Payer: MEDICARE

## 2024-07-10 ENCOUNTER — APPOINTMENT (OUTPATIENT)
Dept: HEMATOLOGY ONCOLOGY | Facility: CLINIC | Age: 69
End: 2024-07-10

## 2024-07-10 VITALS
BODY MASS INDEX: 27.09 KG/M2 | TEMPERATURE: 97.2 F | WEIGHT: 162.56 LBS | SYSTOLIC BLOOD PRESSURE: 119 MMHG | HEIGHT: 65 IN | OXYGEN SATURATION: 98 % | HEART RATE: 59 BPM | DIASTOLIC BLOOD PRESSURE: 66 MMHG

## 2024-07-10 DIAGNOSIS — C80.1 SECONDARY MALIGNANT NEOPLASM OF OTHER SPECIFIED SITES: ICD-10-CM

## 2024-07-10 DIAGNOSIS — C79.89 SECONDARY MALIGNANT NEOPLASM OF OTHER SPECIFIED SITES: ICD-10-CM

## 2024-07-10 LAB
BASOPHILS # BLD AUTO: 0 K/UL — SIGNIFICANT CHANGE UP (ref 0–0.2)
BASOPHILS NFR BLD AUTO: 1 % — SIGNIFICANT CHANGE UP (ref 0–2)
EOSINOPHIL # BLD AUTO: 0.1 K/UL — SIGNIFICANT CHANGE UP (ref 0–0.5)
EOSINOPHIL NFR BLD AUTO: 3.2 % — SIGNIFICANT CHANGE UP (ref 0–6)
HCT VFR BLD CALC: 36.6 % — LOW (ref 39–50)
HGB BLD-MCNC: 12.4 G/DL — LOW (ref 13–17)
LYMPHOCYTES # BLD AUTO: 0.5 K/UL — LOW (ref 1–3.3)
LYMPHOCYTES # BLD AUTO: 13.9 % — SIGNIFICANT CHANGE UP (ref 13–44)
MCHC RBC-ENTMCNC: 31.2 PG — SIGNIFICANT CHANGE UP (ref 27–34)
MCHC RBC-ENTMCNC: 33.9 G/DL — SIGNIFICANT CHANGE UP (ref 32–36)
MCV RBC AUTO: 92.2 FL — SIGNIFICANT CHANGE UP (ref 80–100)
MONOCYTES # BLD AUTO: 0.4 K/UL — SIGNIFICANT CHANGE UP (ref 0–0.9)
MONOCYTES NFR BLD AUTO: 10.2 % — SIGNIFICANT CHANGE UP (ref 2–14)
NEUTROPHILS # BLD AUTO: 2.8 K/UL — SIGNIFICANT CHANGE UP (ref 1.8–7.4)
NEUTROPHILS NFR BLD AUTO: 71.7 % — SIGNIFICANT CHANGE UP (ref 43–77)
PLATELET # BLD AUTO: 160 K/UL — SIGNIFICANT CHANGE UP (ref 150–400)
RBC # BLD: 3.96 M/UL — LOW (ref 4.2–5.8)
RBC # FLD: 11.6 % — SIGNIFICANT CHANGE UP (ref 10.3–14.5)
WBC # BLD: 3.9 K/UL — SIGNIFICANT CHANGE UP (ref 3.8–10.5)
WBC # FLD AUTO: 3.9 K/UL — SIGNIFICANT CHANGE UP (ref 3.8–10.5)

## 2024-07-10 PROCEDURE — 99214 OFFICE O/P EST MOD 30 MIN: CPT

## 2024-07-10 PROCEDURE — 77387B: CUSTOM | Mod: 26

## 2024-07-11 LAB
ALBUMIN SERPL ELPH-MCNC: 4.3 G/DL
ALP BLD-CCNC: 65 U/L
ALT SERPL-CCNC: 15 U/L
AST SERPL-CCNC: 14 U/L
BILIRUB SERPL-MCNC: 0.3 MG/DL
BUN SERPL-MCNC: 35 MG/DL
CALCIUM SERPL-MCNC: 10 MG/DL
CHLORIDE SERPL-SCNC: 103 MMOL/L
CO2 SERPL-SCNC: 25 MMOL/L
CREAT SERPL-MCNC: 1.06 MG/DL
EGFR: 75 ML/MIN/1.73M2
GLUCOSE SERPL-MCNC: 92 MG/DL
MAGNESIUM SERPL-MCNC: 1.5 MG/DL
POTASSIUM SERPL-SCNC: 4.2 MMOL/L
PROT SERPL-MCNC: 6.3 G/DL
SODIUM SERPL-SCNC: 140 MMOL/L

## 2024-07-11 PROCEDURE — 77014: CPT | Mod: 26

## 2024-07-12 ENCOUNTER — NON-APPOINTMENT (OUTPATIENT)
Age: 69
End: 2024-07-12

## 2024-07-12 ENCOUNTER — APPOINTMENT (OUTPATIENT)
Age: 69
End: 2024-07-12

## 2024-07-12 ENCOUNTER — RESULT REVIEW (OUTPATIENT)
Age: 69
End: 2024-07-12

## 2024-07-12 VITALS
RESPIRATION RATE: 16 BRPM | HEART RATE: 60 BPM | BODY MASS INDEX: 26.29 KG/M2 | DIASTOLIC BLOOD PRESSURE: 70 MMHG | OXYGEN SATURATION: 98 % | SYSTOLIC BLOOD PRESSURE: 124 MMHG | WEIGHT: 158 LBS

## 2024-07-12 LAB
ALBUMIN SERPL ELPH-MCNC: 4.3 G/DL — SIGNIFICANT CHANGE UP (ref 3.3–5)
ALP SERPL-CCNC: 57 U/L — SIGNIFICANT CHANGE UP (ref 40–120)
ALT FLD-CCNC: 15 U/L — SIGNIFICANT CHANGE UP (ref 10–45)
ANION GAP SERPL CALC-SCNC: 11 MMOL/L — SIGNIFICANT CHANGE UP (ref 5–17)
AST SERPL-CCNC: 16 U/L — SIGNIFICANT CHANGE UP (ref 10–40)
BILIRUB SERPL-MCNC: 0.3 MG/DL — SIGNIFICANT CHANGE UP (ref 0.2–1.2)
BUN SERPL-MCNC: 24 MG/DL — HIGH (ref 7–23)
CALCIUM SERPL-MCNC: 10 MG/DL — SIGNIFICANT CHANGE UP (ref 8.4–10.5)
CHLORIDE SERPL-SCNC: 103 MMOL/L — SIGNIFICANT CHANGE UP (ref 96–108)
CO2 SERPL-SCNC: 25 MMOL/L — SIGNIFICANT CHANGE UP (ref 22–31)
CREAT SERPL-MCNC: 1 MG/DL — SIGNIFICANT CHANGE UP (ref 0.5–1.3)
EGFR: 80 ML/MIN/1.73M2 — SIGNIFICANT CHANGE UP
GLUCOSE SERPL-MCNC: 111 MG/DL — HIGH (ref 70–99)
MAGNESIUM SERPL-MCNC: 1.7 MG/DL — SIGNIFICANT CHANGE UP (ref 1.6–2.6)
POTASSIUM SERPL-MCNC: 4.5 MMOL/L — SIGNIFICANT CHANGE UP (ref 3.5–5.3)
POTASSIUM SERPL-SCNC: 4.5 MMOL/L — SIGNIFICANT CHANGE UP (ref 3.5–5.3)
PROT SERPL-MCNC: 6.4 G/DL — SIGNIFICANT CHANGE UP (ref 6–8.3)
SODIUM SERPL-SCNC: 139 MMOL/L — SIGNIFICANT CHANGE UP (ref 135–145)

## 2024-07-12 PROCEDURE — 77387B: CUSTOM | Mod: 26

## 2024-07-15 DIAGNOSIS — C79.89 SECONDARY MALIGNANT NEOPLASM OF OTHER SPECIFIED SITES: ICD-10-CM

## 2024-07-15 DIAGNOSIS — C80.1 MALIGNANT (PRIMARY) NEOPLASM, UNSPECIFIED: ICD-10-CM

## 2024-07-15 PROCEDURE — 77387B: CUSTOM | Mod: 26

## 2024-07-15 PROCEDURE — 77427 RADIATION TX MANAGEMENT X5: CPT

## 2024-07-16 PROCEDURE — 77387B: CUSTOM | Mod: 26

## 2024-07-17 PROCEDURE — 77387B: CUSTOM | Mod: 26

## 2024-07-18 ENCOUNTER — RESULT REVIEW (OUTPATIENT)
Age: 69
End: 2024-07-18

## 2024-07-18 ENCOUNTER — NON-APPOINTMENT (OUTPATIENT)
Age: 69
End: 2024-07-18

## 2024-07-18 ENCOUNTER — APPOINTMENT (OUTPATIENT)
Dept: HEMATOLOGY ONCOLOGY | Facility: CLINIC | Age: 69
End: 2024-07-18

## 2024-07-18 VITALS
HEART RATE: 70 BPM | DIASTOLIC BLOOD PRESSURE: 74 MMHG | WEIGHT: 155 LBS | RESPIRATION RATE: 16 BRPM | HEIGHT: 65 IN | BODY MASS INDEX: 25.83 KG/M2 | SYSTOLIC BLOOD PRESSURE: 113 MMHG | OXYGEN SATURATION: 97 %

## 2024-07-18 LAB
BASOPHILS # BLD AUTO: 0 K/UL — SIGNIFICANT CHANGE UP (ref 0–0.2)
BASOPHILS NFR BLD AUTO: 0.8 % — SIGNIFICANT CHANGE UP (ref 0–2)
EOSINOPHIL # BLD AUTO: 0.1 K/UL — SIGNIFICANT CHANGE UP (ref 0–0.5)
EOSINOPHIL NFR BLD AUTO: 2.7 % — SIGNIFICANT CHANGE UP (ref 0–6)
HCT VFR BLD CALC: 33.5 % — LOW (ref 39–50)
HGB BLD-MCNC: 11.6 G/DL — LOW (ref 13–17)
LYMPHOCYTES # BLD AUTO: 0.4 K/UL — LOW (ref 1–3.3)
LYMPHOCYTES # BLD AUTO: 9.3 % — LOW (ref 13–44)
MCHC RBC-ENTMCNC: 31.8 PG — SIGNIFICANT CHANGE UP (ref 27–34)
MCHC RBC-ENTMCNC: 34.5 G/DL — SIGNIFICANT CHANGE UP (ref 32–36)
MCV RBC AUTO: 92 FL — SIGNIFICANT CHANGE UP (ref 80–100)
MONOCYTES # BLD AUTO: 0.4 K/UL — SIGNIFICANT CHANGE UP (ref 0–0.9)
MONOCYTES NFR BLD AUTO: 9.7 % — SIGNIFICANT CHANGE UP (ref 2–14)
NEUTROPHILS # BLD AUTO: 3 K/UL — SIGNIFICANT CHANGE UP (ref 1.8–7.4)
NEUTROPHILS NFR BLD AUTO: 77.5 % — HIGH (ref 43–77)
PLATELET # BLD AUTO: 131 K/UL — LOW (ref 150–400)
RBC # BLD: 3.64 M/UL — LOW (ref 4.2–5.8)
RBC # FLD: 12.3 % — SIGNIFICANT CHANGE UP (ref 10.3–14.5)
WBC # BLD: 3.9 K/UL — SIGNIFICANT CHANGE UP (ref 3.8–10.5)
WBC # FLD AUTO: 3.9 K/UL — SIGNIFICANT CHANGE UP (ref 3.8–10.5)

## 2024-07-18 PROCEDURE — 77014: CPT | Mod: 26

## 2024-07-19 ENCOUNTER — RESULT REVIEW (OUTPATIENT)
Age: 69
End: 2024-07-19

## 2024-07-19 ENCOUNTER — APPOINTMENT (OUTPATIENT)
Age: 69
End: 2024-07-19

## 2024-07-19 LAB
ALBUMIN SERPL ELPH-MCNC: 4.1 G/DL — SIGNIFICANT CHANGE UP (ref 3.3–5)
ALP SERPL-CCNC: 59 U/L — SIGNIFICANT CHANGE UP (ref 40–120)
ALT FLD-CCNC: 16 U/L — SIGNIFICANT CHANGE UP (ref 10–45)
ANION GAP SERPL CALC-SCNC: 12 MMOL/L — SIGNIFICANT CHANGE UP (ref 5–17)
AST SERPL-CCNC: 19 U/L — SIGNIFICANT CHANGE UP (ref 10–40)
BILIRUB SERPL-MCNC: 0.3 MG/DL — SIGNIFICANT CHANGE UP (ref 0.2–1.2)
BUN SERPL-MCNC: 30 MG/DL — HIGH (ref 7–23)
CALCIUM SERPL-MCNC: 9.9 MG/DL — SIGNIFICANT CHANGE UP (ref 8.4–10.5)
CHLORIDE SERPL-SCNC: 105 MMOL/L — SIGNIFICANT CHANGE UP (ref 96–108)
CO2 SERPL-SCNC: 25 MMOL/L — SIGNIFICANT CHANGE UP (ref 22–31)
CREAT SERPL-MCNC: 1.12 MG/DL — SIGNIFICANT CHANGE UP (ref 0.5–1.3)
EGFR: 70 ML/MIN/1.73M2 — SIGNIFICANT CHANGE UP
GLUCOSE SERPL-MCNC: 123 MG/DL — HIGH (ref 70–99)
MAGNESIUM SERPL-MCNC: 1.9 MG/DL — SIGNIFICANT CHANGE UP (ref 1.6–2.6)
POTASSIUM SERPL-MCNC: 4.4 MMOL/L — SIGNIFICANT CHANGE UP (ref 3.5–5.3)
POTASSIUM SERPL-SCNC: 4.4 MMOL/L — SIGNIFICANT CHANGE UP (ref 3.5–5.3)
PROT SERPL-MCNC: 6.7 G/DL — SIGNIFICANT CHANGE UP (ref 6–8.3)
SODIUM SERPL-SCNC: 142 MMOL/L — SIGNIFICANT CHANGE UP (ref 135–145)

## 2024-07-19 PROCEDURE — 77014: CPT | Mod: 26

## 2024-07-22 LAB
ALBUMIN SERPL ELPH-MCNC: 4.2 G/DL
ALP BLD-CCNC: 60 U/L
ALT SERPL-CCNC: 14 U/L
ANION GAP SERPL CALC-SCNC: 10 MMOL/L
AST SERPL-CCNC: 15 U/L
BILIRUB SERPL-MCNC: 0.3 MG/DL
BUN SERPL-MCNC: 31 MG/DL
CALCIUM SERPL-MCNC: 9.7 MG/DL
CHLORIDE SERPL-SCNC: 104 MMOL/L
CO2 SERPL-SCNC: 27 MMOL/L
CREAT SERPL-MCNC: 1.14 MG/DL
EGFR: 69 ML/MIN/1.73M2
GLUCOSE SERPL-MCNC: 107 MG/DL
POTASSIUM SERPL-SCNC: 4.5 MMOL/L
PROT SERPL-MCNC: 6.2 G/DL
SODIUM SERPL-SCNC: 141 MMOL/L

## 2024-07-22 PROCEDURE — 77427 RADIATION TX MANAGEMENT X5: CPT

## 2024-07-22 PROCEDURE — 77387B: CUSTOM | Mod: 26

## 2024-07-23 PROCEDURE — 77387B: CUSTOM | Mod: 26

## 2024-07-24 ENCOUNTER — RESULT REVIEW (OUTPATIENT)
Age: 69
End: 2024-07-24

## 2024-07-24 ENCOUNTER — APPOINTMENT (OUTPATIENT)
Dept: HEMATOLOGY ONCOLOGY | Facility: CLINIC | Age: 69
End: 2024-07-24
Payer: MEDICARE

## 2024-07-24 VITALS
HEIGHT: 65 IN | HEART RATE: 70 BPM | DIASTOLIC BLOOD PRESSURE: 71 MMHG | BODY MASS INDEX: 25.34 KG/M2 | WEIGHT: 152.12 LBS | SYSTOLIC BLOOD PRESSURE: 115 MMHG | OXYGEN SATURATION: 99 %

## 2024-07-24 PROCEDURE — 99214 OFFICE O/P EST MOD 30 MIN: CPT

## 2024-07-24 PROCEDURE — 77387B: CUSTOM | Mod: 26

## 2024-07-24 NOTE — ADDENDUM
[FreeTextEntry1] : Documented by Gerald Valles acting as scribe for Dr. Berman on  07/24/2024.   All Medical record entries made by the Scribe were at my, Dr. Berman's, direction and personally dictated by me on  07/24/2024. I have reviewed the chart and agree that the record accurately reflects my personal performance of the history, physical exam, assessment and plan. I have also personally directed, reviewed, and agreed with the discharge instructions.

## 2024-07-24 NOTE — HISTORY OF PRESENT ILLNESS
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  He presented to Dr. Flo López.  2/8/24 thyroid ultrasound noted a left neck enlarged lymph node with thickened cortex, measuring 2.5 x 1.6 x 2.2 cm.  2/23/24 ultrasound-guided FNA of the left neck lymph node showed squamous cell carcinoma, p16 negative. Features of a lymphoproliferative disorder were not seen.  3/8/24 PET/CT showed an intensely FDG avid left level IIa lymph node (SUV 13; measuring 1.9 x 1.7 cm). No other focal abnormal FDG activity was identified.  He met with Dr. Erich Stewart.  3/19/24 MRI orbit face again noted an enlarged level IIA lymph node, with no additional enlarged cervical lymph nodes identified. There was asymmetric mildly prominent soft tissue in the left vallecula/tongue base, nonspecific.  3/19/24 CT chest showed a stable benign intrapulmonary nodule along the left proximal interlobar fissure.  3/21/24 biopsy of the posterior vertex scalp showed actinic keratosis.  3/26/24 PET/CT showed a hypermetabolic left internal jugular lymph node level 2, with asymmetric hypermetabolic activity in ill-defined 1 cm area of nodularity of the left base of tongue, suspicious for occult head and neck malignancy.  4/19/24 biopsy of the lingual surface of the epiglottis and left base of tongue were negative. Excision of the left cervical lymph node showed poorly differentiated squamous cell carcinoma, focally keratinized with necrosis. p53 focally positive; suggestive of wild type. p16 positive.        [de-identified] : Patient completed 6 Cycles of Cisplatin on 7/19/24. Started RT on 6/14/24, planned for 6 weeks. Current Dose: 4800. Planned Dose: 6000. Treatment Site: pharynx as of 7/18/24 Patient continues with some dysphagia.  He admits to decreased appetite and altered taste, especially with solid foods. Fluids are going down well. He usually drinks 3 ensures per day.   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

## 2024-07-24 NOTE — HISTORY OF PRESENT ILLNESS
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  He presented to Dr. Flo López.  2/8/24 thyroid ultrasound noted a left neck enlarged lymph node with thickened cortex, measuring 2.5 x 1.6 x 2.2 cm.  2/23/24 ultrasound-guided FNA of the left neck lymph node showed squamous cell carcinoma, p16 negative. Features of a lymphoproliferative disorder were not seen.  3/8/24 PET/CT showed an intensely FDG avid left level IIa lymph node (SUV 13; measuring 1.9 x 1.7 cm). No other focal abnormal FDG activity was identified.  He met with Dr. Erich Stewart.  3/19/24 MRI orbit face again noted an enlarged level IIA lymph node, with no additional enlarged cervical lymph nodes identified. There was asymmetric mildly prominent soft tissue in the left vallecula/tongue base, nonspecific.  3/19/24 CT chest showed a stable benign intrapulmonary nodule along the left proximal interlobar fissure.  3/21/24 biopsy of the posterior vertex scalp showed actinic keratosis.  3/26/24 PET/CT showed a hypermetabolic left internal jugular lymph node level 2, with asymmetric hypermetabolic activity in ill-defined 1 cm area of nodularity of the left base of tongue, suspicious for occult head and neck malignancy.  4/19/24 biopsy of the lingual surface of the epiglottis and left base of tongue were negative. Excision of the left cervical lymph node showed poorly differentiated squamous cell carcinoma, focally keratinized with necrosis. p53 focally positive; suggestive of wild type. p16 positive.        [de-identified] : Patient completed 6 Cycles of Cisplatin on 7/19/24. Started RT on 6/14/24, planned for 6 weeks. Current Dose: 4800. Planned Dose: 6000. Treatment Site: pharynx as of 7/18/24 Patient continues with some dysphagia.  He admits to decreased appetite and altered taste, especially with solid foods. Fluids are going down well. He usually drinks 3 ensures per day.   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

## 2024-07-24 NOTE — REVIEW OF SYSTEMS
[Constipation] : constipation [Diarrhea: Grade 0] : Diarrhea: Grade 0 [Negative] : Allergic/Immunologic [Abdominal Pain] : no abdominal pain [Vomiting] : no vomiting

## 2024-07-24 NOTE — ASSESSMENT
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 (T0N1M0) p16+  Tolerating concurrent chemoradiation well.   Plan - Completed 6 Cycles of Cisplatin on 7/19/24.  - Started RT on 6/14/24 - Continue f/u with Dr. Wharton - Scan after treatment roughly 2-3 months  [AdvancecareDate] : 06/28/24

## 2024-07-25 ENCOUNTER — NON-APPOINTMENT (OUTPATIENT)
Age: 69
End: 2024-07-25

## 2024-07-25 ENCOUNTER — APPOINTMENT (OUTPATIENT)
Dept: HEMATOLOGY ONCOLOGY | Facility: CLINIC | Age: 69
End: 2024-07-25

## 2024-07-25 VITALS
WEIGHT: 152 LBS | HEIGHT: 65 IN | RESPIRATION RATE: 15 BRPM | HEART RATE: 73 BPM | DIASTOLIC BLOOD PRESSURE: 75 MMHG | BODY MASS INDEX: 25.33 KG/M2 | OXYGEN SATURATION: 98 % | TEMPERATURE: 97.2 F | SYSTOLIC BLOOD PRESSURE: 122 MMHG

## 2024-07-25 DIAGNOSIS — L58.9 RADIODERMATITIS, UNSPECIFIED: ICD-10-CM

## 2024-07-25 LAB
BASOPHILS # BLD AUTO: 0 K/UL — SIGNIFICANT CHANGE UP (ref 0–0.2)
BASOPHILS NFR BLD AUTO: 1 % — SIGNIFICANT CHANGE UP (ref 0–2)
EOSINOPHIL # BLD AUTO: 0 K/UL — SIGNIFICANT CHANGE UP (ref 0–0.5)
EOSINOPHIL NFR BLD AUTO: 0.9 % — SIGNIFICANT CHANGE UP (ref 0–6)
HCT VFR BLD CALC: 31.7 % — LOW (ref 39–50)
HGB BLD-MCNC: 11.3 G/DL — LOW (ref 13–17)
LYMPHOCYTES # BLD AUTO: 0.2 K/UL — LOW (ref 1–3.3)
LYMPHOCYTES # BLD AUTO: 6.6 % — LOW (ref 13–44)
MCHC RBC-ENTMCNC: 32.4 PG — SIGNIFICANT CHANGE UP (ref 27–34)
MCHC RBC-ENTMCNC: 35.6 G/DL — SIGNIFICANT CHANGE UP (ref 32–36)
MCV RBC AUTO: 90.9 FL — SIGNIFICANT CHANGE UP (ref 80–100)
MONOCYTES # BLD AUTO: 0.3 K/UL — SIGNIFICANT CHANGE UP (ref 0–0.9)
MONOCYTES NFR BLD AUTO: 8.4 % — SIGNIFICANT CHANGE UP (ref 2–14)
NEUTROPHILS # BLD AUTO: 3.1 K/UL — SIGNIFICANT CHANGE UP (ref 1.8–7.4)
NEUTROPHILS NFR BLD AUTO: 83.1 % — HIGH (ref 43–77)
PLATELET # BLD AUTO: 169 K/UL — SIGNIFICANT CHANGE UP (ref 150–400)
RBC # BLD: 3.48 M/UL — LOW (ref 4.2–5.8)
RBC # FLD: 12.3 % — SIGNIFICANT CHANGE UP (ref 10.3–14.5)
WBC # BLD: 3.7 K/UL — LOW (ref 3.8–10.5)
WBC # FLD AUTO: 3.7 K/UL — LOW (ref 3.8–10.5)

## 2024-07-25 PROCEDURE — 77014: CPT | Mod: 26

## 2024-07-25 RX ORDER — SILVER SULFADIAZINE 10 MG/G
1 CREAM TOPICAL
Qty: 1 | Refills: 5 | Status: ACTIVE | COMMUNITY
Start: 2024-07-25 | End: 1900-01-01

## 2024-07-25 NOTE — PHYSICAL EXAM
[Extraocular Movements] : extraocular movements were intact [Outer Ear] : the ears and nose were normal in appearance [] : no respiratory distress [Exaggerated Use Of Accessory Muscles For Inspiration] : no accessory muscle use [Heart Rate And Rhythm] : heart rate and rhythm were normal [Normal] : oriented to person, place and time, the affect was normal, the mood was normal and not anxious [de-identified] : grade 2 /dry desquamation [de-identified] : moderate hyperpigmentation and erythema of the neck, with small areas of dry desquamation

## 2024-07-25 NOTE — PHYSICAL EXAM
[Extraocular Movements] : extraocular movements were intact [Outer Ear] : the ears and nose were normal in appearance [] : no respiratory distress [Exaggerated Use Of Accessory Muscles For Inspiration] : no accessory muscle use [Heart Rate And Rhythm] : heart rate and rhythm were normal [Normal] : no palpable adenopathy [de-identified] : grade 2 /dry desquamation [de-identified] : moderate hyperpigmentation and erythema of the neck, with small areas of dry desquamation

## 2024-07-25 NOTE — REVIEW OF SYSTEMS
[Fatigue: Grade 1 - Fatigue relieved by rest] : Fatigue: Grade 1 - Fatigue relieved by rest [Mucositis Oral: Grade 0] : Mucositis Oral: Grade 0  [Xerostomia: Grade 0] : Xerostomia: Grade 0 [Oral Pain: Grade 1 - Mild pain] : Oral Pain: Grade 1 - Mild pain [Dysgeusia: Grade 2 - Altered taste with change in diet (e.g., oral supplements); noxious or unpleasant taste; loss of taste] : Dysgeusia: Grade 2 - Altered taste with change in diet (e.g., oral supplements); noxious or unpleasant taste; loss of taste [Pruritus: Grade 0] : Pruritus: Grade 0 [Dysphagia: Grade 1 - Symptomatic, able to eat regular diet] : Dysphagia: Grade 1 - Symptomatic, able to eat regular diet [Skin Hyperpigmentation: Grade 1 - Hyperpigmentation covering <10% BSA; no psychosocial impact] : Skin Hyperpigmentation: Grade 1 - Hyperpigmentation covering <10% BSA; no psychosocial impact [Dermatitis Radiation: Grade 1 - Faint erythema or dry desquamation] : Dermatitis Radiation: Grade 1 - Faint erythema or dry desquamation

## 2024-07-25 NOTE — VITALS
[Maximal Pain Intensity: 1/10] : 1/10 [Least Pain Intensity: 0/10] : 0/10 [80: Normal activity with effort; some signs or symptoms of disease.] : 80: Normal activity with effort; some signs or symptoms of disease.  [Maximal Pain Intensity: 0/10] : 0/10 [Pain Description/Quality: ___] : Pain description/quality: [unfilled] [NoTreatment Scheduled] : no treatment scheduled

## 2024-07-25 NOTE — DISEASE MANAGEMENT
[Clinical] : TNM Stage: c [III] : III [FreeTextEntry4] : H&N squamous cell carcinoma, unknown primary, p16 positive. [TTNM] : 0 [NTNM] : 1 [MTNM] : 0 [de-identified] : 9040 [de-identified] : 9859 [de-identified] : pharynx

## 2024-07-25 NOTE — DISEASE MANAGEMENT
[Clinical] : TNM Stage: c [III] : III [FreeTextEntry4] : H&N squamous cell carcinoma, unknown primary, p16 positive. [TTNM] : 0 [NTNM] : 1 [MTNM] : 0 [de-identified] : 0775 [de-identified] : 6999 [de-identified] : pharynx

## 2024-07-26 LAB
ALBUMIN SERPL ELPH-MCNC: 4.2 G/DL
ALP BLD-CCNC: 58 U/L
ALT SERPL-CCNC: 14 U/L
ANION GAP SERPL CALC-SCNC: 13 MMOL/L
AST SERPL-CCNC: 16 U/L
BILIRUB SERPL-MCNC: 0.4 MG/DL
BUN SERPL-MCNC: 30 MG/DL
CALCIUM SERPL-MCNC: 9.4 MG/DL
CHLORIDE SERPL-SCNC: 103 MMOL/L
CO2 SERPL-SCNC: 26 MMOL/L
CREAT SERPL-MCNC: 1.18 MG/DL
EGFR: 66 ML/MIN/1.73M2
GLUCOSE SERPL-MCNC: 93 MG/DL
MAGNESIUM SERPL-MCNC: 1.4 MG/DL
POTASSIUM SERPL-SCNC: 4.1 MMOL/L
PROT SERPL-MCNC: 6.1 G/DL
SODIUM SERPL-SCNC: 141 MMOL/L

## 2024-07-26 PROCEDURE — 77387B: CUSTOM | Mod: 26

## 2024-07-30 ENCOUNTER — NON-APPOINTMENT (OUTPATIENT)
Age: 69
End: 2024-07-30

## 2024-08-02 ENCOUNTER — NON-APPOINTMENT (OUTPATIENT)
Age: 69
End: 2024-08-02

## 2024-08-02 ENCOUNTER — APPOINTMENT (OUTPATIENT)
Dept: RADIATION ONCOLOGY | Facility: CLINIC | Age: 69
End: 2024-08-02
Payer: MEDICARE

## 2024-08-02 VITALS
OXYGEN SATURATION: 98 % | DIASTOLIC BLOOD PRESSURE: 74 MMHG | RESPIRATION RATE: 15 BRPM | HEIGHT: 65 IN | TEMPERATURE: 97.8 F | WEIGHT: 142.8 LBS | BODY MASS INDEX: 23.79 KG/M2 | HEART RATE: 71 BPM | SYSTOLIC BLOOD PRESSURE: 123 MMHG

## 2024-08-02 PROCEDURE — 99024 POSTOP FOLLOW-UP VISIT: CPT

## 2024-08-02 RX ORDER — NYSTATIN 100000 [USP'U]/ML
100000 SUSPENSION ORAL 4 TIMES DAILY
Qty: 1 | Refills: 5 | Status: ACTIVE | COMMUNITY
Start: 2024-08-02 | End: 1900-01-01

## 2024-08-02 NOTE — HISTORY OF PRESENT ILLNESS
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  Returns today for annother  post-treatment evaluation.  Interval history: Patient and wife discussed his nutritional intake with Nutrition.  Advised high calorie/high protein drinks and diluting if too thick. He notes interval increase in thick oral secretions, with new-onset left throat odynophagia.  He did not experience throat pain during coarse of treatment.   Continues with skin care. No xerostomia   Care team: Med Onc Philip Berman Surg Onc Erich López

## 2024-08-08 ENCOUNTER — APPOINTMENT (OUTPATIENT)
Dept: RADIATION ONCOLOGY | Facility: CLINIC | Age: 69
End: 2024-08-08

## 2024-08-08 ENCOUNTER — NON-APPOINTMENT (OUTPATIENT)
Age: 69
End: 2024-08-08

## 2024-08-08 PROCEDURE — 99024 POSTOP FOLLOW-UP VISIT: CPT

## 2024-08-08 NOTE — REVIEW OF SYSTEMS
[Loss of Hearing] : loss of hearing [Negative] : Allergic/Immunologic [Dysphagia: Grade 0] : Dysphagia: Grade 0 [Mucositis Oral: Grade 1 - Asymptomatic or mild symptoms; intervention not indicated] : Mucositis Oral: Grade 1 - Asymptomatic or mild symptoms; intervention not indicated [Oral Pain: Grade 0] : Oral Pain: Grade 0 [Dysgeusia: Grade 1- Altered taste but no change in diet] : Dysgeusia: Grade 1 - Altered taste but no change in diet [Skin Hyperpigmentation: Grade 0] : Skin Hyperpigmentation: Grade 0 [Dermatitis Radiation: Grade 0] : Dermatitis Radiation: Grade 0

## 2024-08-08 NOTE — DISEASE MANAGEMENT
[FreeTextEntry4] : H&N squamous cell carcinoma, unknown primary, p16 positive. [TTNM] : 0 [NTNM] : 1 [MTNM] : 0

## 2024-08-08 NOTE — PHYSICAL EXAM
[Normal] : normal heart rate and rhythm, normal S1 and S2, and no murmurs present [Extraocular Movements] : extraocular movements were intact [Outer Ear] : the ears and nose were normal in appearance [Normal Oral Cavity] : oral cavity was normal [Oropharynx] : The oropharynx was normal [Skin Color & Pigmentation] : normal skin color and pigmentation [de-identified] : oral candidiasis improved; slight remant

## 2024-08-08 NOTE — HISTORY OF PRESENT ILLNESS
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  Returns today for close post-treatment evaluation.  Interval history: Reports significant improvement with odynophagia and some improvement in dysgeusia. Interval weight gain of 5lbs since last week. Hearing loss with chemotherapy. Continues with skin care. No significant xerostomia.  Care team: Med Onc Philip Berman Surg Onc Erich Stewart JUSTINA Flo López Oral Surgeon Rashad Calderon

## 2024-08-08 NOTE — PHYSICAL EXAM
[Normal] : normal heart rate and rhythm, normal S1 and S2, and no murmurs present [Extraocular Movements] : extraocular movements were intact [Normal Oral Cavity] : oral cavity was normal [Outer Ear] : the ears and nose were normal in appearance [Oropharynx] : The oropharynx was normal [Skin Color & Pigmentation] : normal skin color and pigmentation [de-identified] : oral candidiasis improved; slight remant

## 2024-08-12 NOTE — HISTORY OF PRESENT ILLNESS
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  Returns today for close post-treatment evaluation.  Interval history: xerostomia, dysgeusia, odynophagia, dysphagia, pain, fatigue or weight loss  Care team: Med Onc Philip Berman Surg Onc Erich López Oral Surgeon Rashad Calderon

## 2024-08-14 ENCOUNTER — OUTPATIENT (OUTPATIENT)
Dept: OUTPATIENT SERVICES | Facility: HOSPITAL | Age: 69
LOS: 1 days | Discharge: HOME | End: 2024-08-14

## 2024-08-14 DIAGNOSIS — C79.89 SECONDARY MALIGNANT NEOPLASM OF OTHER SPECIFIED SITES: ICD-10-CM

## 2024-08-19 ENCOUNTER — APPOINTMENT (OUTPATIENT)
Dept: RADIATION ONCOLOGY | Facility: CLINIC | Age: 69
End: 2024-08-19

## 2024-08-21 ENCOUNTER — RESULT REVIEW (OUTPATIENT)
Age: 69
End: 2024-08-21

## 2024-08-21 ENCOUNTER — APPOINTMENT (OUTPATIENT)
Dept: HEMATOLOGY ONCOLOGY | Facility: CLINIC | Age: 69
End: 2024-08-21
Payer: MEDICARE

## 2024-08-21 VITALS
HEART RATE: 61 BPM | HEIGHT: 65 IN | DIASTOLIC BLOOD PRESSURE: 83 MMHG | BODY MASS INDEX: 25.75 KG/M2 | WEIGHT: 154.54 LBS | OXYGEN SATURATION: 100 % | SYSTOLIC BLOOD PRESSURE: 132 MMHG

## 2024-08-21 LAB
BASOPHILS # BLD AUTO: 0 K/UL — SIGNIFICANT CHANGE UP (ref 0–0.2)
BASOPHILS NFR BLD AUTO: 0.7 % — SIGNIFICANT CHANGE UP (ref 0–2)
EOSINOPHIL # BLD AUTO: 0 K/UL — SIGNIFICANT CHANGE UP (ref 0–0.5)
EOSINOPHIL NFR BLD AUTO: 0.8 % — SIGNIFICANT CHANGE UP (ref 0–6)
HCT VFR BLD CALC: 29.1 % — LOW (ref 39–50)
HGB BLD-MCNC: 9.9 G/DL — LOW (ref 13–17)
LYMPHOCYTES # BLD AUTO: 0.5 K/UL — LOW (ref 1–3.3)
LYMPHOCYTES # BLD AUTO: 11.8 % — LOW (ref 13–44)
MCHC RBC-ENTMCNC: 32.3 PG — SIGNIFICANT CHANGE UP (ref 27–34)
MCHC RBC-ENTMCNC: 33.9 G/DL — SIGNIFICANT CHANGE UP (ref 32–36)
MCV RBC AUTO: 95.2 FL — SIGNIFICANT CHANGE UP (ref 80–100)
MONOCYTES # BLD AUTO: 0.4 K/UL — SIGNIFICANT CHANGE UP (ref 0–0.9)
MONOCYTES NFR BLD AUTO: 11 % — SIGNIFICANT CHANGE UP (ref 2–14)
NEUTROPHILS # BLD AUTO: 3 K/UL — SIGNIFICANT CHANGE UP (ref 1.8–7.4)
NEUTROPHILS NFR BLD AUTO: 75.7 % — SIGNIFICANT CHANGE UP (ref 43–77)
PLATELET # BLD AUTO: 227 K/UL — SIGNIFICANT CHANGE UP (ref 150–400)
RBC # BLD: 3.06 M/UL — LOW (ref 4.2–5.8)
RBC # FLD: 15.3 % — HIGH (ref 10.3–14.5)
WBC # BLD: 3.9 K/UL — SIGNIFICANT CHANGE UP (ref 3.8–10.5)
WBC # FLD AUTO: 3.9 K/UL — SIGNIFICANT CHANGE UP (ref 3.8–10.5)

## 2024-08-21 PROCEDURE — 99215 OFFICE O/P EST HI 40 MIN: CPT

## 2024-08-21 NOTE — ADDENDUM
[FreeTextEntry1] : Documented by Gerald Valles acting as scribe for Dr. Berman on  08/21/2024.   All Medical record entries made by the Scribe were at my, Dr. Berman's, direction and personally dictated by me on  08/21/2024. I have reviewed the chart and agree that the record accurately reflects my personal performance of the history, physical exam, assessment and plan. I have also personally directed, reviewed, and agreed with the discharge instructions.

## 2024-08-21 NOTE — HISTORY OF PRESENT ILLNESS
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  He presented to Dr. Flo López.  2/8/24 thyroid ultrasound noted a left neck enlarged lymph node with thickened cortex, measuring 2.5 x 1.6 x 2.2 cm.  2/23/24 ultrasound-guided FNA of the left neck lymph node showed squamous cell carcinoma, p16 negative. Features of a lymphoproliferative disorder were not seen.  3/8/24 PET/CT showed an intensely FDG avid left level IIa lymph node (SUV 13; measuring 1.9 x 1.7 cm). No other focal abnormal FDG activity was identified.  He met with Dr. Erich Stewart.  3/19/24 MRI orbit face again noted an enlarged level IIA lymph node, with no additional enlarged cervical lymph nodes identified. There was asymmetric mildly prominent soft tissue in the left vallecula/tongue base, nonspecific.  3/19/24 CT chest showed a stable benign intrapulmonary nodule along the left proximal interlobar fissure.  3/21/24 biopsy of the posterior vertex scalp showed actinic keratosis.  3/26/24 PET/CT showed a hypermetabolic left internal jugular lymph node level 2, with asymmetric hypermetabolic activity in ill-defined 1 cm area of nodularity of the left base of tongue, suspicious for occult head and neck malignancy.  4/19/24 biopsy of the lingual surface of the epiglottis and left base of tongue were negative. Excision of the left cervical lymph node showed poorly differentiated squamous cell carcinoma, focally keratinized with necrosis. p53 focally positive; suggestive of wild type. p16 positive.        [de-identified] : Patient completed 6 Cycles of Cisplatin on 7/19/24. Completed RT on 7/26/24.  Patient admits to improving dysphagia with recent weight gain as he is eating more at home. Patient admits to approximately 7 pounds of weight gain. No other complaints.  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

## 2024-08-21 NOTE — ASSESSMENT
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 (T0N1M0) p16+  Tolerating concurrent chemoradiation well.   Plan - Completed 6 Cycles of Cisplatin on 7/19/24.  - Completed RT on 7/26/24 - Continue f/u with Dr. Wharton - Scan after treatment roughly 2-3 months - RTO in 1 month.    [AdvancecareDate] : 06/28/24

## 2024-08-21 NOTE — HISTORY OF PRESENT ILLNESS
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  He presented to Dr. Flo López.  2/8/24 thyroid ultrasound noted a left neck enlarged lymph node with thickened cortex, measuring 2.5 x 1.6 x 2.2 cm.  2/23/24 ultrasound-guided FNA of the left neck lymph node showed squamous cell carcinoma, p16 negative. Features of a lymphoproliferative disorder were not seen.  3/8/24 PET/CT showed an intensely FDG avid left level IIa lymph node (SUV 13; measuring 1.9 x 1.7 cm). No other focal abnormal FDG activity was identified.  He met with Dr. Erich Stewart.  3/19/24 MRI orbit face again noted an enlarged level IIA lymph node, with no additional enlarged cervical lymph nodes identified. There was asymmetric mildly prominent soft tissue in the left vallecula/tongue base, nonspecific.  3/19/24 CT chest showed a stable benign intrapulmonary nodule along the left proximal interlobar fissure.  3/21/24 biopsy of the posterior vertex scalp showed actinic keratosis.  3/26/24 PET/CT showed a hypermetabolic left internal jugular lymph node level 2, with asymmetric hypermetabolic activity in ill-defined 1 cm area of nodularity of the left base of tongue, suspicious for occult head and neck malignancy.  4/19/24 biopsy of the lingual surface of the epiglottis and left base of tongue were negative. Excision of the left cervical lymph node showed poorly differentiated squamous cell carcinoma, focally keratinized with necrosis. p53 focally positive; suggestive of wild type. p16 positive.        [de-identified] : Patient completed 6 Cycles of Cisplatin on 7/19/24. Completed RT on 7/26/24.  Patient admits to improving dysphagia with recent weight gain as he is eating more at home. Patient admits to approximately 7 pounds of weight gain. No other complaints.  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

## 2024-08-21 NOTE — ASSESSMENT
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 (T0N1M0) p16+  Tolerating concurrent chemoradiation well.   Plan - Completed 6 Cycles of Cisplatin on 7/19/24.  - Completed RT on 7/26/24 - Continue f/u with Dr. Wharton - Scan after treatment roughly 2-3 months - RTO in 1 month.    [AdvancecareDate] : 06/28/24

## 2024-08-22 LAB
ALBUMIN SERPL ELPH-MCNC: 4.1 G/DL
ALP BLD-CCNC: 60 U/L
ALT SERPL-CCNC: 11 U/L
ANION GAP SERPL CALC-SCNC: 12 MMOL/L
AST SERPL-CCNC: 11 U/L
BILIRUB SERPL-MCNC: 0.3 MG/DL
BUN SERPL-MCNC: 21 MG/DL
CALCIUM SERPL-MCNC: 9.5 MG/DL
CHLORIDE SERPL-SCNC: 107 MMOL/L
CO2 SERPL-SCNC: 24 MMOL/L
CREAT SERPL-MCNC: 0.97 MG/DL
EGFR: 83 ML/MIN/1.73M2
GLUCOSE SERPL-MCNC: 89 MG/DL
MAGNESIUM SERPL-MCNC: 1.6 MG/DL
POTASSIUM SERPL-SCNC: 4.3 MMOL/L
PROT SERPL-MCNC: 5.9 G/DL
SODIUM SERPL-SCNC: 142 MMOL/L

## 2024-08-27 ENCOUNTER — NON-APPOINTMENT (OUTPATIENT)
Age: 69
End: 2024-08-27

## 2024-08-29 ENCOUNTER — APPOINTMENT (OUTPATIENT)
Dept: RADIATION ONCOLOGY | Facility: CLINIC | Age: 69
End: 2024-08-29
Payer: MEDICARE

## 2024-08-29 VITALS
OXYGEN SATURATION: 99 % | BODY MASS INDEX: 25.46 KG/M2 | RESPIRATION RATE: 16 BRPM | WEIGHT: 153 LBS | HEART RATE: 64 BPM | DIASTOLIC BLOOD PRESSURE: 81 MMHG | SYSTOLIC BLOOD PRESSURE: 132 MMHG

## 2024-08-29 DIAGNOSIS — C79.89 SECONDARY MALIGNANT NEOPLASM OF OTHER SPECIFIED SITES: ICD-10-CM

## 2024-08-29 DIAGNOSIS — C80.1 SECONDARY MALIGNANT NEOPLASM OF OTHER SPECIFIED SITES: ICD-10-CM

## 2024-08-29 PROCEDURE — 99024 POSTOP FOLLOW-UP VISIT: CPT

## 2024-08-29 NOTE — HISTORY OF PRESENT ILLNESS
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  Returns today for close post-treatment evaluation.  Interval history: Went to dentist earlier today. Notes interval resolution of dysgeusia and phlegm.  Tolerating regular diet.  Feels well.  Interval weight gain.  Care team: Med Onc Philip Berman Surg Onc Erich Stewart JUSTINA Flo López Oral Surgeon Rashad Calderon

## 2024-08-29 NOTE — PHYSICAL EXAM
[Extraocular Movements] : extraocular movements were intact [Outer Ear] : the ears and nose were normal in appearance [Examination Of The Oral Cavity] : the lips and gums were normal [Normal Oral Cavity] : oral cavity was normal [Oropharynx] : The oropharynx was normal [Cervical Lymph Nodes Enlarged Posterior Bilaterally] : posterior cervical [Cervical Lymph Nodes Enlarged Anterior Bilaterally] : anterior cervical [Supraclavicular Lymph Nodes Enlarged Bilaterally] : supraclavicular [Normal] : oriented to person, place and time, the affect was normal, the mood was normal and not anxious

## 2024-08-29 NOTE — REVIEW OF SYSTEMS
[Fatigue: Grade 0] : Fatigue: Grade 0 [Mucositis Oral: Grade 0] : Mucositis Oral: Grade 0  [Xerostomia: Grade 0] : Xerostomia: Grade 0 [Oral Pain: Grade 0] : Oral Pain: Grade 0 [Salivary duct inflammation: Grade 0] : Salivary duct inflammation: Grade 0 [Dysgeusia: Grade 0] : Dysgeusia: Grade 0 [Pruritus: Grade 0] : Pruritus: Grade 0 [Skin Hyperpigmentation: Grade 0] : Skin Hyperpigmentation: Grade 0 [Dermatitis Radiation: Grade 0] : Dermatitis Radiation: Grade 0

## 2024-09-25 ENCOUNTER — APPOINTMENT (OUTPATIENT)
Dept: HEMATOLOGY ONCOLOGY | Facility: CLINIC | Age: 69
End: 2024-09-25
Payer: MEDICARE

## 2024-09-25 ENCOUNTER — RESULT REVIEW (OUTPATIENT)
Age: 69
End: 2024-09-25

## 2024-09-25 VITALS
HEIGHT: 65 IN | BODY MASS INDEX: 24.54 KG/M2 | HEART RATE: 65 BPM | TEMPERATURE: 98.1 F | DIASTOLIC BLOOD PRESSURE: 77 MMHG | SYSTOLIC BLOOD PRESSURE: 121 MMHG | OXYGEN SATURATION: 100 % | WEIGHT: 147.27 LBS

## 2024-09-25 DIAGNOSIS — C79.89 SECONDARY MALIGNANT NEOPLASM OF OTHER SPECIFIED SITES: ICD-10-CM

## 2024-09-25 DIAGNOSIS — C80.1 SECONDARY MALIGNANT NEOPLASM OF OTHER SPECIFIED SITES: ICD-10-CM

## 2024-09-25 LAB
BASOPHILS # BLD AUTO: 0 K/UL — SIGNIFICANT CHANGE UP (ref 0–0.2)
BASOPHILS NFR BLD AUTO: 0.5 % — SIGNIFICANT CHANGE UP (ref 0–2)
EOSINOPHIL # BLD AUTO: 0.1 K/UL — SIGNIFICANT CHANGE UP (ref 0–0.5)
EOSINOPHIL NFR BLD AUTO: 1.6 % — SIGNIFICANT CHANGE UP (ref 0–6)
HCT VFR BLD CALC: 32.2 % — LOW (ref 39–50)
HGB BLD-MCNC: 11 G/DL — LOW (ref 13–17)
LYMPHOCYTES # BLD AUTO: 0.4 K/UL — LOW (ref 1–3.3)
LYMPHOCYTES # BLD AUTO: 5.4 % — LOW (ref 13–44)
MCHC RBC-ENTMCNC: 33.9 PG — SIGNIFICANT CHANGE UP (ref 27–34)
MCHC RBC-ENTMCNC: 34.3 G/DL — SIGNIFICANT CHANGE UP (ref 32–36)
MCV RBC AUTO: 98.9 FL — SIGNIFICANT CHANGE UP (ref 80–100)
MONOCYTES # BLD AUTO: 0.5 K/UL — SIGNIFICANT CHANGE UP (ref 0–0.9)
MONOCYTES NFR BLD AUTO: 8 % — SIGNIFICANT CHANGE UP (ref 2–14)
NEUTROPHILS # BLD AUTO: 5.7 K/UL — SIGNIFICANT CHANGE UP (ref 1.8–7.4)
NEUTROPHILS NFR BLD AUTO: 84.5 % — HIGH (ref 43–77)
PLATELET # BLD AUTO: 307 K/UL — SIGNIFICANT CHANGE UP (ref 150–400)
RBC # BLD: 3.25 M/UL — LOW (ref 4.2–5.8)
RBC # FLD: 12.2 % — SIGNIFICANT CHANGE UP (ref 10.3–14.5)
WBC # BLD: 6.8 K/UL — SIGNIFICANT CHANGE UP (ref 3.8–10.5)
WBC # FLD AUTO: 6.8 K/UL — SIGNIFICANT CHANGE UP (ref 3.8–10.5)

## 2024-09-25 PROCEDURE — 99214 OFFICE O/P EST MOD 30 MIN: CPT

## 2024-09-26 NOTE — ADDENDUM
[FreeTextEntry1] : Documented by Gerald Valles acting as scribe for Dr. Berman on  09/25/2024.   All Medical record entries made by the Scribe were at my, Dr. Berman's, direction and personally dictated by me on  09/25/2024. I have reviewed the chart and agree that the record accurately reflects my personal performance of the history, physical exam, assessment and plan. I have also personally directed, reviewed, and agreed with the discharge instructions.

## 2024-09-26 NOTE — ASSESSMENT
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 (T0N1M0) p16+  Tolerated concurrent chemoradiation well, but slow to recover weight and appetite.   Plan - Completed 6 Cycles of Cisplatin on 7/19/24.  - Completed RT on 7/26/24 - Continue f/u with Dr. Wharton - Scan after treatment roughly 2-3 months - RTO in 1 month.    [AdvancecareDate] : 06/28/24

## 2024-09-26 NOTE — REVIEW OF SYSTEMS
[Constipation] : constipation [Diarrhea: Grade 0] : Diarrhea: Grade 0 [Negative] : Allergic/Immunologic [Fatigue] : fatigue [Abdominal Pain] : no abdominal pain [Vomiting] : no vomiting [FreeTextEntry7] : Appetite slow to return

## 2024-09-26 NOTE — HISTORY OF PRESENT ILLNESS
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  He presented to Dr. Flo López.  2/8/24 thyroid ultrasound noted a left neck enlarged lymph node with thickened cortex, measuring 2.5 x 1.6 x 2.2 cm.  2/23/24 ultrasound-guided FNA of the left neck lymph node showed squamous cell carcinoma, p16 negative. Features of a lymphoproliferative disorder were not seen.  3/8/24 PET/CT showed an intensely FDG avid left level IIa lymph node (SUV 13; measuring 1.9 x 1.7 cm). No other focal abnormal FDG activity was identified.  He met with Dr. Erich Stewart.  3/19/24 MRI orbit face again noted an enlarged level IIA lymph node, with no additional enlarged cervical lymph nodes identified. There was asymmetric mildly prominent soft tissue in the left vallecula/tongue base, nonspecific.  3/19/24 CT chest showed a stable benign intrapulmonary nodule along the left proximal interlobar fissure.  3/21/24 biopsy of the posterior vertex scalp showed actinic keratosis.  3/26/24 PET/CT showed a hypermetabolic left internal jugular lymph node level 2, with asymmetric hypermetabolic activity in ill-defined 1 cm area of nodularity of the left base of tongue, suspicious for occult head and neck malignancy.  4/19/24 biopsy of the lingual surface of the epiglottis and left base of tongue were negative. Excision of the left cervical lymph node showed poorly differentiated squamous cell carcinoma, focally keratinized with necrosis. p53 focally positive; suggestive of wild type. p16 positive.        [de-identified] : Patient completed 6 Cycles of Cisplatin on 7/19/24. Completed RT on 7/26/24.  Recently in the hospital, diagnosed with diverticulitis after experiencing a fever and abdominal pain. Currently on abx for two weeks.  Appetite continues to be diminished and he is finding it difficult to put on weight. Patient is eating, but smaller portions than he is used to.  He also mentions fatigue but attributes this to working ~80 hours/week as a .    9/25/24 CBC: WBC 6.8 K, HGB 11.0 g, HCT 32.2 %,  K, ANC 5700

## 2024-09-26 NOTE — HISTORY OF PRESENT ILLNESS
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  He presented to Dr. Flo López.  2/8/24 thyroid ultrasound noted a left neck enlarged lymph node with thickened cortex, measuring 2.5 x 1.6 x 2.2 cm.  2/23/24 ultrasound-guided FNA of the left neck lymph node showed squamous cell carcinoma, p16 negative. Features of a lymphoproliferative disorder were not seen.  3/8/24 PET/CT showed an intensely FDG avid left level IIa lymph node (SUV 13; measuring 1.9 x 1.7 cm). No other focal abnormal FDG activity was identified.  He met with Dr. Erich Stewart.  3/19/24 MRI orbit face again noted an enlarged level IIA lymph node, with no additional enlarged cervical lymph nodes identified. There was asymmetric mildly prominent soft tissue in the left vallecula/tongue base, nonspecific.  3/19/24 CT chest showed a stable benign intrapulmonary nodule along the left proximal interlobar fissure.  3/21/24 biopsy of the posterior vertex scalp showed actinic keratosis.  3/26/24 PET/CT showed a hypermetabolic left internal jugular lymph node level 2, with asymmetric hypermetabolic activity in ill-defined 1 cm area of nodularity of the left base of tongue, suspicious for occult head and neck malignancy.  4/19/24 biopsy of the lingual surface of the epiglottis and left base of tongue were negative. Excision of the left cervical lymph node showed poorly differentiated squamous cell carcinoma, focally keratinized with necrosis. p53 focally positive; suggestive of wild type. p16 positive.        [de-identified] : Patient completed 6 Cycles of Cisplatin on 7/19/24. Completed RT on 7/26/24.  Recently in the hospital, diagnosed with diverticulitis after experiencing a fever and abdominal pain. Currently on abx for two weeks.  Appetite continues to be diminished and he is finding it difficult to put on weight. Patient is eating, but smaller portions than he is used to.  He also mentions fatigue but attributes this to working ~80 hours/week as a .    9/25/24 CBC: WBC 6.8 K, HGB 11.0 g, HCT 32.2 %,  K, ANC 5700

## 2024-10-09 ENCOUNTER — NON-APPOINTMENT (OUTPATIENT)
Age: 69
End: 2024-10-09

## 2024-10-29 ENCOUNTER — OUTPATIENT (OUTPATIENT)
Dept: OUTPATIENT SERVICES | Facility: HOSPITAL | Age: 69
LOS: 1 days | Discharge: HOME | End: 2024-10-29

## 2024-10-29 DIAGNOSIS — C79.89 SECONDARY MALIGNANT NEOPLASM OF OTHER SPECIFIED SITES: ICD-10-CM

## 2024-10-30 ENCOUNTER — OUTPATIENT (OUTPATIENT)
Dept: OUTPATIENT SERVICES | Facility: HOSPITAL | Age: 69
LOS: 1 days | End: 2024-10-30

## 2024-10-30 ENCOUNTER — APPOINTMENT (OUTPATIENT)
Dept: NUCLEAR MEDICINE | Facility: CLINIC | Age: 69
End: 2024-10-30
Payer: MEDICARE

## 2024-10-30 DIAGNOSIS — C79.89 SECONDARY MALIGNANT NEOPLASM OF OTHER SPECIFIED SITES: ICD-10-CM

## 2024-10-30 PROCEDURE — 78815 PET IMAGE W/CT SKULL-THIGH: CPT | Mod: 26,PS

## 2024-11-06 ENCOUNTER — RESULT REVIEW (OUTPATIENT)
Age: 69
End: 2024-11-06

## 2024-11-06 ENCOUNTER — APPOINTMENT (OUTPATIENT)
Dept: HEMATOLOGY ONCOLOGY | Facility: CLINIC | Age: 69
End: 2024-11-06
Payer: MEDICARE

## 2024-11-06 ENCOUNTER — NON-APPOINTMENT (OUTPATIENT)
Age: 69
End: 2024-11-06

## 2024-11-06 VITALS
TEMPERATURE: 98.2 F | SYSTOLIC BLOOD PRESSURE: 112 MMHG | DIASTOLIC BLOOD PRESSURE: 77 MMHG | WEIGHT: 150.9 LBS | HEIGHT: 65 IN | BODY MASS INDEX: 25.14 KG/M2 | OXYGEN SATURATION: 97 % | HEART RATE: 60 BPM

## 2024-11-06 LAB
BASOPHILS # BLD AUTO: 0.1 K/UL — SIGNIFICANT CHANGE UP (ref 0–0.2)
BASOPHILS NFR BLD AUTO: 1.5 % — SIGNIFICANT CHANGE UP (ref 0–2)
EOSINOPHIL # BLD AUTO: 0.1 K/UL — SIGNIFICANT CHANGE UP (ref 0–0.5)
EOSINOPHIL NFR BLD AUTO: 1.8 % — SIGNIFICANT CHANGE UP (ref 0–6)
HCT VFR BLD CALC: 35 % — LOW (ref 39–50)
HGB BLD-MCNC: 12.3 G/DL — LOW (ref 13–17)
LYMPHOCYTES # BLD AUTO: 0.6 K/UL — LOW (ref 1–3.3)
LYMPHOCYTES # BLD AUTO: 12.4 % — LOW (ref 13–44)
MCHC RBC-ENTMCNC: 32.7 PG — SIGNIFICANT CHANGE UP (ref 27–34)
MCHC RBC-ENTMCNC: 35.1 G/DL — SIGNIFICANT CHANGE UP (ref 32–36)
MCV RBC AUTO: 93.2 FL — SIGNIFICANT CHANGE UP (ref 80–100)
MONOCYTES # BLD AUTO: 0.3 K/UL — SIGNIFICANT CHANGE UP (ref 0–0.9)
MONOCYTES NFR BLD AUTO: 5.6 % — SIGNIFICANT CHANGE UP (ref 2–14)
NEUTROPHILS # BLD AUTO: 3.9 K/UL — SIGNIFICANT CHANGE UP (ref 1.8–7.4)
NEUTROPHILS NFR BLD AUTO: 78.7 % — HIGH (ref 43–77)
PLATELET # BLD AUTO: 180 K/UL — SIGNIFICANT CHANGE UP (ref 150–400)
RBC # BLD: 3.75 M/UL — LOW (ref 4.2–5.8)
RBC # FLD: 11.8 % — SIGNIFICANT CHANGE UP (ref 10.3–14.5)
WBC # BLD: 4.9 K/UL — SIGNIFICANT CHANGE UP (ref 3.8–10.5)
WBC # FLD AUTO: 4.9 K/UL — SIGNIFICANT CHANGE UP (ref 3.8–10.5)

## 2024-11-06 PROCEDURE — 99214 OFFICE O/P EST MOD 30 MIN: CPT

## 2024-11-07 ENCOUNTER — NON-APPOINTMENT (OUTPATIENT)
Age: 69
End: 2024-11-07

## 2024-11-07 LAB
ALBUMIN SERPL ELPH-MCNC: 4.2 G/DL
ALP BLD-CCNC: 62 U/L
ALT SERPL-CCNC: 15 U/L
ANION GAP SERPL CALC-SCNC: 12 MMOL/L
AST SERPL-CCNC: 18 U/L
BILIRUB SERPL-MCNC: 0.3 MG/DL
BUN SERPL-MCNC: 24 MG/DL
CALCIUM SERPL-MCNC: 10.1 MG/DL
CHLORIDE SERPL-SCNC: 107 MMOL/L
CO2 SERPL-SCNC: 24 MMOL/L
CREAT SERPL-MCNC: 1.12 MG/DL
EGFR: 70 ML/MIN/1.73M2
GLUCOSE SERPL-MCNC: 85 MG/DL
MAGNESIUM SERPL-MCNC: 1.8 MG/DL
POTASSIUM SERPL-SCNC: 4.3 MMOL/L
PROT SERPL-MCNC: 6.3 G/DL
PSA SERPL-MCNC: 10.2 NG/ML
SODIUM SERPL-SCNC: 143 MMOL/L

## 2024-11-08 ENCOUNTER — APPOINTMENT (OUTPATIENT)
Dept: RADIATION ONCOLOGY | Facility: CLINIC | Age: 69
End: 2024-11-08
Payer: MEDICARE

## 2024-11-08 ENCOUNTER — NON-APPOINTMENT (OUTPATIENT)
Age: 69
End: 2024-11-08

## 2024-11-08 VITALS
DIASTOLIC BLOOD PRESSURE: 79 MMHG | WEIGHT: 150 LBS | BODY MASS INDEX: 24.96 KG/M2 | RESPIRATION RATE: 16 BRPM | HEART RATE: 64 BPM | OXYGEN SATURATION: 98 % | SYSTOLIC BLOOD PRESSURE: 120 MMHG

## 2024-11-08 DIAGNOSIS — C79.89 SECONDARY MALIGNANT NEOPLASM OF OTHER SPECIFIED SITES: ICD-10-CM

## 2024-11-08 DIAGNOSIS — C80.1 SECONDARY MALIGNANT NEOPLASM OF OTHER SPECIFIED SITES: ICD-10-CM

## 2024-11-08 DIAGNOSIS — R97.20 ELEVATED PROSTATE, SPECIFIC ANTIGEN [PSA]: ICD-10-CM

## 2024-11-08 PROCEDURE — 99214 OFFICE O/P EST MOD 30 MIN: CPT

## 2024-11-08 PROCEDURE — G2211 COMPLEX E/M VISIT ADD ON: CPT

## 2025-01-27 ENCOUNTER — OUTPATIENT (OUTPATIENT)
Dept: OUTPATIENT SERVICES | Facility: HOSPITAL | Age: 70
LOS: 1 days | End: 2025-01-27
Payer: MEDICARE

## 2025-01-27 ENCOUNTER — APPOINTMENT (OUTPATIENT)
Dept: CT IMAGING | Facility: CLINIC | Age: 70
End: 2025-01-27
Payer: MEDICARE

## 2025-01-27 DIAGNOSIS — R97.20 ELEVATED PROSTATE SPECIFIC ANTIGEN [PSA]: ICD-10-CM

## 2025-01-27 PROCEDURE — 71260 CT THORAX DX C+: CPT | Mod: 26

## 2025-01-27 PROCEDURE — 70491 CT SOFT TISSUE NECK W/DYE: CPT

## 2025-01-27 PROCEDURE — 71260 CT THORAX DX C+: CPT

## 2025-01-27 PROCEDURE — 70491 CT SOFT TISSUE NECK W/DYE: CPT | Mod: 26

## 2025-01-28 ENCOUNTER — OUTPATIENT (OUTPATIENT)
Dept: OUTPATIENT SERVICES | Facility: HOSPITAL | Age: 70
LOS: 1 days | Discharge: HOME | End: 2025-01-28

## 2025-01-28 DIAGNOSIS — C80.1 MALIGNANT (PRIMARY) NEOPLASM, UNSPECIFIED: ICD-10-CM

## 2025-01-28 DIAGNOSIS — C79.89 SECONDARY MALIGNANT NEOPLASM OF OTHER SPECIFIED SITES: ICD-10-CM

## 2025-02-05 ENCOUNTER — APPOINTMENT (OUTPATIENT)
Dept: HEMATOLOGY ONCOLOGY | Facility: CLINIC | Age: 70
End: 2025-02-05
Payer: MEDICARE

## 2025-02-05 ENCOUNTER — NON-APPOINTMENT (OUTPATIENT)
Age: 70
End: 2025-02-05

## 2025-02-05 ENCOUNTER — RESULT REVIEW (OUTPATIENT)
Age: 70
End: 2025-02-05

## 2025-02-05 ENCOUNTER — APPOINTMENT (OUTPATIENT)
Dept: RADIATION ONCOLOGY | Facility: CLINIC | Age: 70
End: 2025-02-05
Payer: MEDICARE

## 2025-02-05 VITALS
WEIGHT: 149 LBS | RESPIRATION RATE: 16 BRPM | SYSTOLIC BLOOD PRESSURE: 126 MMHG | TEMPERATURE: 98.3 F | HEART RATE: 81 BPM | DIASTOLIC BLOOD PRESSURE: 78 MMHG | OXYGEN SATURATION: 98 % | HEIGHT: 65 IN | BODY MASS INDEX: 24.83 KG/M2

## 2025-02-05 DIAGNOSIS — C79.89 SECONDARY MALIGNANT NEOPLASM OF OTHER SPECIFIED SITES: ICD-10-CM

## 2025-02-05 DIAGNOSIS — C80.1 SECONDARY MALIGNANT NEOPLASM OF OTHER SPECIFIED SITES: ICD-10-CM

## 2025-02-05 DIAGNOSIS — R97.20 ELEVATED PROSTATE, SPECIFIC ANTIGEN [PSA]: ICD-10-CM

## 2025-02-05 LAB
BASOPHILS # BLD AUTO: 0.1 K/UL — SIGNIFICANT CHANGE UP (ref 0–0.2)
BASOPHILS NFR BLD AUTO: 1.4 % — SIGNIFICANT CHANGE UP (ref 0–2)
EOSINOPHIL # BLD AUTO: 0.1 K/UL — SIGNIFICANT CHANGE UP (ref 0–0.5)
EOSINOPHIL NFR BLD AUTO: 3.4 % — SIGNIFICANT CHANGE UP (ref 0–6)
HCT VFR BLD CALC: 36.7 % — LOW (ref 39–50)
HGB BLD-MCNC: 12.7 G/DL — LOW (ref 13–17)
LYMPHOCYTES # BLD AUTO: 0.4 K/UL — LOW (ref 1–3.3)
LYMPHOCYTES # BLD AUTO: 10.3 % — LOW (ref 13–44)
MCHC RBC-ENTMCNC: 31.7 PG — SIGNIFICANT CHANGE UP (ref 27–34)
MCHC RBC-ENTMCNC: 34.6 G/DL — SIGNIFICANT CHANGE UP (ref 32–36)
MCV RBC AUTO: 91.6 FL — SIGNIFICANT CHANGE UP (ref 80–100)
MONOCYTES # BLD AUTO: 0.4 K/UL — SIGNIFICANT CHANGE UP (ref 0–0.9)
MONOCYTES NFR BLD AUTO: 9.5 % — SIGNIFICANT CHANGE UP (ref 2–14)
NEUTROPHILS # BLD AUTO: 3.3 K/UL — SIGNIFICANT CHANGE UP (ref 1.8–7.4)
NEUTROPHILS NFR BLD AUTO: 75.4 % — SIGNIFICANT CHANGE UP (ref 43–77)
PLATELET # BLD AUTO: 230 K/UL — SIGNIFICANT CHANGE UP (ref 150–400)
RBC # BLD: 4 M/UL — LOW (ref 4.2–5.8)
RBC # FLD: 11.6 % — SIGNIFICANT CHANGE UP (ref 10.3–14.5)
WBC # BLD: 4.3 K/UL — SIGNIFICANT CHANGE UP (ref 3.8–10.5)
WBC # FLD AUTO: 4.3 K/UL — SIGNIFICANT CHANGE UP (ref 3.8–10.5)

## 2025-02-05 PROCEDURE — 99214 OFFICE O/P EST MOD 30 MIN: CPT

## 2025-02-05 PROCEDURE — G2211 COMPLEX E/M VISIT ADD ON: CPT

## 2025-02-06 DIAGNOSIS — E78.5 HYPERLIPIDEMIA, UNSPECIFIED: ICD-10-CM

## 2025-02-06 DIAGNOSIS — E03.9 HYPOTHYROIDISM, UNSPECIFIED: ICD-10-CM

## 2025-02-06 LAB
ALBUMIN SERPL ELPH-MCNC: 4.2 G/DL
ALP BLD-CCNC: 83 U/L
ALT SERPL-CCNC: 13 U/L
ANION GAP SERPL CALC-SCNC: 13 MMOL/L
AST SERPL-CCNC: 13 U/L
BILIRUB SERPL-MCNC: 0.5 MG/DL
BUN SERPL-MCNC: 16 MG/DL
CALCIUM SERPL-MCNC: 9.9 MG/DL
CHLORIDE SERPL-SCNC: 105 MMOL/L
CO2 SERPL-SCNC: 24 MMOL/L
CREAT SERPL-MCNC: 1.09 MG/DL
EGFR: 73 ML/MIN/1.73M2
GLUCOSE SERPL-MCNC: 127 MG/DL
MAGNESIUM SERPL-MCNC: 1.7 MG/DL
POTASSIUM SERPL-SCNC: 3.5 MMOL/L
PROT SERPL-MCNC: 6.2 G/DL
PSA SERPL-MCNC: 14.1 NG/ML
SODIUM SERPL-SCNC: 142 MMOL/L

## 2025-03-05 ENCOUNTER — NON-APPOINTMENT (OUTPATIENT)
Age: 70
End: 2025-03-05

## 2025-03-06 DIAGNOSIS — C61 MALIGNANT NEOPLASM OF PROSTATE: ICD-10-CM

## 2025-03-10 ENCOUNTER — APPOINTMENT (OUTPATIENT)
Dept: SURGERY | Facility: CLINIC | Age: 70
End: 2025-03-10

## 2025-03-12 ENCOUNTER — NON-APPOINTMENT (OUTPATIENT)
Age: 70
End: 2025-03-12

## 2025-03-13 ENCOUNTER — OUTPATIENT (OUTPATIENT)
Dept: OUTPATIENT SERVICES | Facility: HOSPITAL | Age: 70
LOS: 1 days | End: 2025-03-13
Payer: MEDICARE

## 2025-03-13 ENCOUNTER — APPOINTMENT (OUTPATIENT)
Dept: NUCLEAR MEDICINE | Facility: CLINIC | Age: 70
End: 2025-03-13

## 2025-03-13 DIAGNOSIS — C61 MALIGNANT NEOPLASM OF PROSTATE: ICD-10-CM

## 2025-03-13 PROCEDURE — 78816 PET IMAGE W/CT FULL BODY: CPT | Mod: 26

## 2025-03-19 ENCOUNTER — APPOINTMENT (OUTPATIENT)
Dept: HEMATOLOGY ONCOLOGY | Facility: CLINIC | Age: 70
End: 2025-03-19
Payer: MEDICARE

## 2025-03-19 ENCOUNTER — APPOINTMENT (OUTPATIENT)
Dept: HEMATOLOGY ONCOLOGY | Facility: CLINIC | Age: 70
End: 2025-03-19

## 2025-03-19 ENCOUNTER — RESULT REVIEW (OUTPATIENT)
Age: 70
End: 2025-03-19

## 2025-03-19 ENCOUNTER — APPOINTMENT (OUTPATIENT)
Dept: RADIATION ONCOLOGY | Facility: CLINIC | Age: 70
End: 2025-03-19
Payer: MEDICARE

## 2025-03-19 VITALS
BODY MASS INDEX: 25.33 KG/M2 | OXYGEN SATURATION: 97 % | SYSTOLIC BLOOD PRESSURE: 122 MMHG | RESPIRATION RATE: 16 BRPM | WEIGHT: 152 LBS | TEMPERATURE: 98.1 F | HEIGHT: 65 IN | HEART RATE: 86 BPM | DIASTOLIC BLOOD PRESSURE: 78 MMHG

## 2025-03-19 VITALS
TEMPERATURE: 99.1 F | BODY MASS INDEX: 25.31 KG/M2 | OXYGEN SATURATION: 97 % | WEIGHT: 151.9 LBS | SYSTOLIC BLOOD PRESSURE: 122 MMHG | HEART RATE: 94 BPM | HEIGHT: 65 IN | DIASTOLIC BLOOD PRESSURE: 84 MMHG

## 2025-03-19 DIAGNOSIS — C61 MALIGNANT NEOPLASM OF PROSTATE: ICD-10-CM

## 2025-03-19 DIAGNOSIS — R97.20 ELEVATED PROSTATE, SPECIFIC ANTIGEN [PSA]: ICD-10-CM

## 2025-03-19 DIAGNOSIS — C79.89 SECONDARY MALIGNANT NEOPLASM OF OTHER SPECIFIED SITES: ICD-10-CM

## 2025-03-19 DIAGNOSIS — C80.1 SECONDARY MALIGNANT NEOPLASM OF OTHER SPECIFIED SITES: ICD-10-CM

## 2025-03-19 LAB
BASOPHILS # BLD AUTO: 0.01 K/UL — SIGNIFICANT CHANGE UP (ref 0–0.2)
BASOPHILS NFR BLD AUTO: 0.2 % — SIGNIFICANT CHANGE UP (ref 0–2)
EOSINOPHIL # BLD AUTO: 0.03 K/UL — SIGNIFICANT CHANGE UP (ref 0–0.5)
EOSINOPHIL NFR BLD AUTO: 0.7 % — SIGNIFICANT CHANGE UP (ref 0–6)
HCT VFR BLD CALC: 34.9 % — LOW (ref 39–50)
HGB BLD-MCNC: 12.2 G/DL — LOW (ref 13–17)
IMM GRANULOCYTES # BLD AUTO: 0.01 K/UL — SIGNIFICANT CHANGE UP (ref 0–0.07)
IMM GRANULOCYTES NFR BLD AUTO: 0.2 % — SIGNIFICANT CHANGE UP (ref 0–0.9)
LYMPHOCYTES # BLD AUTO: 0.69 K/UL — LOW (ref 1–3.3)
LYMPHOCYTES NFR BLD AUTO: 16.2 % — SIGNIFICANT CHANGE UP (ref 13–44)
MCHC RBC-ENTMCNC: 31.4 PG — SIGNIFICANT CHANGE UP (ref 27–34)
MCHC RBC-ENTMCNC: 35 G/DL — SIGNIFICANT CHANGE UP (ref 32–36)
MCV RBC AUTO: 89.7 FL — SIGNIFICANT CHANGE UP (ref 80–100)
MONOCYTES # BLD AUTO: 0.34 K/UL — SIGNIFICANT CHANGE UP (ref 0–0.9)
MONOCYTES NFR BLD AUTO: 8 % — SIGNIFICANT CHANGE UP (ref 2–14)
NEUTROPHILS # BLD AUTO: 3.18 K/UL — SIGNIFICANT CHANGE UP (ref 1.8–7.4)
NEUTROPHILS NFR BLD AUTO: 74.7 % — SIGNIFICANT CHANGE UP (ref 43–77)
NRBC # BLD AUTO: 0 K/UL — SIGNIFICANT CHANGE UP (ref 0–0)
NRBC # FLD: 0 K/UL — SIGNIFICANT CHANGE UP (ref 0–0)
NRBC BLD AUTO-RTO: 0 /100 WBCS — SIGNIFICANT CHANGE UP (ref 0–0)
PLATELET # BLD AUTO: 209 K/UL — SIGNIFICANT CHANGE UP (ref 150–400)
PMV BLD: 8.4 FL — SIGNIFICANT CHANGE UP (ref 7–13)
RBC # BLD: 3.89 M/UL — LOW (ref 4.2–5.8)
RBC # FLD: 12.8 % — SIGNIFICANT CHANGE UP (ref 10.3–14.5)
WBC # BLD: 4.26 K/UL — SIGNIFICANT CHANGE UP (ref 3.8–10.5)
WBC # FLD AUTO: 4.26 K/UL — SIGNIFICANT CHANGE UP (ref 3.8–10.5)

## 2025-03-19 PROCEDURE — 99214 OFFICE O/P EST MOD 30 MIN: CPT

## 2025-03-19 PROCEDURE — 99215 OFFICE O/P EST HI 40 MIN: CPT

## 2025-03-19 PROCEDURE — G2211 COMPLEX E/M VISIT ADD ON: CPT

## 2025-03-21 LAB
ALBUMIN SERPL ELPH-MCNC: 4.4 G/DL
ALP BLD-CCNC: 68 U/L
ALT SERPL-CCNC: 13 U/L
ANION GAP SERPL CALC-SCNC: 15 MMOL/L
AST SERPL-CCNC: 14 U/L
BILIRUB SERPL-MCNC: 0.6 MG/DL
BUN SERPL-MCNC: 18 MG/DL
CALCIUM SERPL-MCNC: 10 MG/DL
CHLORIDE SERPL-SCNC: 111 MMOL/L
CO2 SERPL-SCNC: 23 MMOL/L
CREAT SERPL-MCNC: 1.06 MG/DL
EGFRCR SERPLBLD CKD-EPI 2021: 75 ML/MIN/1.73M2
GLUCOSE SERPL-MCNC: 94 MG/DL
POTASSIUM SERPL-SCNC: 3.9 MMOL/L
PROT SERPL-MCNC: 6.4 G/DL
PSA SERPL-MCNC: 11.5 NG/ML
SODIUM SERPL-SCNC: 149 MMOL/L

## 2025-04-18 ENCOUNTER — NON-APPOINTMENT (OUTPATIENT)
Age: 70
End: 2025-04-18

## 2025-04-18 ENCOUNTER — RESULT REVIEW (OUTPATIENT)
Age: 70
End: 2025-04-18

## 2025-04-18 VITALS
BODY MASS INDEX: 25.33 KG/M2 | HEART RATE: 70 BPM | SYSTOLIC BLOOD PRESSURE: 145 MMHG | RESPIRATION RATE: 15 BRPM | WEIGHT: 152 LBS | TEMPERATURE: 97.6 F | OXYGEN SATURATION: 99 % | HEIGHT: 65 IN | DIASTOLIC BLOOD PRESSURE: 81 MMHG

## 2025-04-22 ENCOUNTER — NON-APPOINTMENT (OUTPATIENT)
Age: 70
End: 2025-04-22

## 2025-04-24 ENCOUNTER — NON-APPOINTMENT (OUTPATIENT)
Age: 70
End: 2025-04-24

## 2025-04-24 VITALS
HEART RATE: 61 BPM | OXYGEN SATURATION: 99 % | SYSTOLIC BLOOD PRESSURE: 133 MMHG | DIASTOLIC BLOOD PRESSURE: 83 MMHG | BODY MASS INDEX: 24.96 KG/M2 | TEMPERATURE: 98 F | WEIGHT: 150 LBS | RESPIRATION RATE: 16 BRPM

## 2025-05-01 ENCOUNTER — NON-APPOINTMENT (OUTPATIENT)
Age: 70
End: 2025-05-01

## 2025-05-01 VITALS
HEIGHT: 65 IN | HEART RATE: 69 BPM | TEMPERATURE: 97.8 F | RESPIRATION RATE: 15 BRPM | WEIGHT: 151 LBS | DIASTOLIC BLOOD PRESSURE: 75 MMHG | OXYGEN SATURATION: 99 % | SYSTOLIC BLOOD PRESSURE: 133 MMHG | BODY MASS INDEX: 25.16 KG/M2

## 2025-05-08 ENCOUNTER — NON-APPOINTMENT (OUTPATIENT)
Age: 70
End: 2025-05-08

## 2025-05-08 VITALS
TEMPERATURE: 97.9 F | SYSTOLIC BLOOD PRESSURE: 138 MMHG | BODY MASS INDEX: 24.83 KG/M2 | OXYGEN SATURATION: 98 % | RESPIRATION RATE: 16 BRPM | HEIGHT: 65 IN | DIASTOLIC BLOOD PRESSURE: 67 MMHG | HEART RATE: 65 BPM | WEIGHT: 149 LBS

## 2025-05-15 ENCOUNTER — NON-APPOINTMENT (OUTPATIENT)
Age: 70
End: 2025-05-15

## 2025-05-15 VITALS
BODY MASS INDEX: 24.79 KG/M2 | DIASTOLIC BLOOD PRESSURE: 79 MMHG | OXYGEN SATURATION: 99 % | WEIGHT: 149 LBS | RESPIRATION RATE: 16 BRPM | HEART RATE: 64 BPM | SYSTOLIC BLOOD PRESSURE: 141 MMHG

## 2025-05-22 ENCOUNTER — NON-APPOINTMENT (OUTPATIENT)
Age: 70
End: 2025-05-22

## 2025-05-22 VITALS
WEIGHT: 151 LBS | TEMPERATURE: 97.8 F | OXYGEN SATURATION: 99 % | HEIGHT: 65 IN | BODY MASS INDEX: 25.16 KG/M2 | HEART RATE: 65 BPM | RESPIRATION RATE: 15 BRPM | DIASTOLIC BLOOD PRESSURE: 79 MMHG | SYSTOLIC BLOOD PRESSURE: 141 MMHG

## 2025-06-18 ENCOUNTER — NON-APPOINTMENT (OUTPATIENT)
Age: 70
End: 2025-06-18

## 2025-06-18 ENCOUNTER — APPOINTMENT (OUTPATIENT)
Dept: RADIATION ONCOLOGY | Facility: CLINIC | Age: 70
End: 2025-06-18

## 2025-06-19 ENCOUNTER — OUTPATIENT (OUTPATIENT)
Dept: OUTPATIENT SERVICES | Facility: HOSPITAL | Age: 70
LOS: 1 days | Discharge: HOME | End: 2025-06-19

## 2025-06-19 DIAGNOSIS — C80.1 MALIGNANT (PRIMARY) NEOPLASM, UNSPECIFIED: ICD-10-CM

## 2025-06-19 DIAGNOSIS — C79.89 SECONDARY MALIGNANT NEOPLASM OF OTHER SPECIFIED SITES: ICD-10-CM

## 2025-06-25 ENCOUNTER — LABORATORY RESULT (OUTPATIENT)
Age: 70
End: 2025-06-25

## 2025-06-25 ENCOUNTER — APPOINTMENT (OUTPATIENT)
Dept: HEMATOLOGY ONCOLOGY | Facility: CLINIC | Age: 70
End: 2025-06-25

## 2025-06-25 ENCOUNTER — APPOINTMENT (OUTPATIENT)
Dept: HEMATOLOGY ONCOLOGY | Facility: CLINIC | Age: 70
End: 2025-06-25
Payer: MEDICARE

## 2025-06-25 ENCOUNTER — RESULT REVIEW (OUTPATIENT)
Age: 70
End: 2025-06-25

## 2025-06-25 ENCOUNTER — NON-APPOINTMENT (OUTPATIENT)
Age: 70
End: 2025-06-25

## 2025-06-25 ENCOUNTER — APPOINTMENT (OUTPATIENT)
Dept: RADIATION ONCOLOGY | Facility: CLINIC | Age: 70
End: 2025-06-25
Payer: MEDICARE

## 2025-06-25 VITALS
HEIGHT: 65 IN | SYSTOLIC BLOOD PRESSURE: 138 MMHG | DIASTOLIC BLOOD PRESSURE: 82 MMHG | HEART RATE: 65 BPM | WEIGHT: 149.47 LBS | OXYGEN SATURATION: 98 % | BODY MASS INDEX: 24.9 KG/M2 | TEMPERATURE: 97.7 F

## 2025-06-25 LAB
ANISOCYTOSIS BLD QL: SLIGHT — SIGNIFICANT CHANGE UP
BASOPHILS # BLD AUTO: 0.01 K/UL — SIGNIFICANT CHANGE UP (ref 0–0.2)
BASOPHILS # BLD MANUAL: 0 K/UL — SIGNIFICANT CHANGE UP (ref 0–0.2)
BASOPHILS NFR BLD AUTO: 0.2 % — SIGNIFICANT CHANGE UP (ref 0–2)
BASOPHILS NFR BLD MANUAL: 0 % — SIGNIFICANT CHANGE UP (ref 0–2)
EOSINOPHIL # BLD AUTO: 0.07 K/UL — SIGNIFICANT CHANGE UP (ref 0–0.5)
EOSINOPHIL # BLD MANUAL: 0.04 K/UL — SIGNIFICANT CHANGE UP (ref 0–0.5)
EOSINOPHIL NFR BLD AUTO: 1.7 % — SIGNIFICANT CHANGE UP (ref 0–6)
EOSINOPHIL NFR BLD MANUAL: 1 % — SIGNIFICANT CHANGE UP (ref 0–6)
HCT VFR BLD CALC: 36.5 % — LOW (ref 39–50)
HGB BLD-MCNC: 12.5 G/DL — LOW (ref 13–17)
HYPOCHROMIA BLD QL: SLIGHT — SIGNIFICANT CHANGE UP
IMM GRANULOCYTES # BLD AUTO: 0.03 K/UL — SIGNIFICANT CHANGE UP (ref 0–0.07)
IMM GRANULOCYTES NFR BLD AUTO: 0.7 % — SIGNIFICANT CHANGE UP (ref 0–0.9)
LG PLATELETS BLD QL AUTO: SLIGHT — SIGNIFICANT CHANGE UP
LYMPHOCYTES # BLD AUTO: 0.43 K/UL — LOW (ref 1–3.3)
LYMPHOCYTES # BLD MANUAL: 0.37 K/UL — LOW (ref 1–3.3)
LYMPHOCYTES NFR BLD AUTO: 10.4 % — LOW (ref 13–44)
LYMPHOCYTES NFR BLD MANUAL: 9 % — LOW (ref 13–44)
MACROCYTES BLD QL: SLIGHT — SIGNIFICANT CHANGE UP
MANUAL SMEAR VERIFICATION: SIGNIFICANT CHANGE UP
MCHC RBC-ENTMCNC: 30.3 PG — SIGNIFICANT CHANGE UP (ref 27–34)
MCHC RBC-ENTMCNC: 34.2 G/DL — SIGNIFICANT CHANGE UP (ref 32–36)
MCV RBC AUTO: 88.4 FL — SIGNIFICANT CHANGE UP (ref 80–100)
MICROCYTES BLD QL: SLIGHT — SIGNIFICANT CHANGE UP
MONOCYTES # BLD AUTO: 0.3 K/UL — SIGNIFICANT CHANGE UP (ref 0–0.9)
MONOCYTES # BLD MANUAL: 0.12 K/UL — SIGNIFICANT CHANGE UP (ref 0–0.9)
MONOCYTES NFR BLD AUTO: 7.2 % — SIGNIFICANT CHANGE UP (ref 2–14)
MONOCYTES NFR BLD MANUAL: 3 % — SIGNIFICANT CHANGE UP (ref 2–14)
NEUTROPHILS # BLD AUTO: 3.31 K/UL — SIGNIFICANT CHANGE UP (ref 1.8–7.4)
NEUTROPHILS # BLD MANUAL: 3.61 K/UL — SIGNIFICANT CHANGE UP (ref 1.8–7.4)
NEUTROPHILS NFR BLD AUTO: 79.8 % — HIGH (ref 43–77)
NEUTROPHILS NFR BLD MANUAL: 87 % — HIGH (ref 43–77)
NRBC # BLD AUTO: 0 K/UL — SIGNIFICANT CHANGE UP (ref 0–0)
NRBC # FLD: 0 K/UL — SIGNIFICANT CHANGE UP (ref 0–0)
NRBC BLD AUTO-RTO: 0 /100 WBCS — SIGNIFICANT CHANGE UP (ref 0–0)
PLAT MORPH BLD: NORMAL — SIGNIFICANT CHANGE UP
PLATELET # BLD AUTO: 212 K/UL — SIGNIFICANT CHANGE UP (ref 150–400)
PMV BLD: 8.3 FL — SIGNIFICANT CHANGE UP (ref 7–13)
RBC # BLD: 4.13 M/UL — LOW (ref 4.2–5.8)
RBC # FLD: 13.2 % — SIGNIFICANT CHANGE UP (ref 10.3–14.5)
RBC BLD AUTO: SIGNIFICANT CHANGE UP
WBC # BLD: 4.15 K/UL — SIGNIFICANT CHANGE UP (ref 3.8–10.5)
WBC # FLD AUTO: 4.15 K/UL — SIGNIFICANT CHANGE UP (ref 3.8–10.5)

## 2025-06-25 PROCEDURE — 99024 POSTOP FOLLOW-UP VISIT: CPT

## 2025-06-25 PROCEDURE — 99214 OFFICE O/P EST MOD 30 MIN: CPT

## 2025-06-26 LAB
ALBUMIN SERPL ELPH-MCNC: 4.5 G/DL
ALP BLD-CCNC: 81 U/L
ALT SERPL-CCNC: 19 U/L
ANION GAP SERPL CALC-SCNC: 14 MMOL/L
AST SERPL-CCNC: 21 U/L
BILIRUB SERPL-MCNC: 0.5 MG/DL
BUN SERPL-MCNC: 21 MG/DL
CALCIUM SERPL-MCNC: 10.5 MG/DL
CHLORIDE SERPL-SCNC: 109 MMOL/L
CO2 SERPL-SCNC: 23 MMOL/L
CREAT SERPL-MCNC: 1.27 MG/DL
EGFRCR SERPLBLD CKD-EPI 2021: 60 ML/MIN/1.73M2
GLUCOSE SERPL-MCNC: 104 MG/DL
MAGNESIUM SERPL-MCNC: 1.9 MG/DL
POTASSIUM SERPL-SCNC: 4 MMOL/L
PROT SERPL-MCNC: 6.5 G/DL
PSA SERPL-MCNC: 6.1 NG/ML
SODIUM SERPL-SCNC: 145 MMOL/L

## 2025-07-29 ENCOUNTER — OUTPATIENT (OUTPATIENT)
Dept: OUTPATIENT SERVICES | Facility: HOSPITAL | Age: 70
LOS: 1 days | End: 2025-07-29
Payer: MEDICARE

## 2025-07-29 ENCOUNTER — APPOINTMENT (OUTPATIENT)
Dept: CT IMAGING | Facility: CLINIC | Age: 70
End: 2025-07-29

## 2025-07-29 DIAGNOSIS — C79.89 SECONDARY MALIGNANT NEOPLASM OF OTHER SPECIFIED SITES: ICD-10-CM

## 2025-07-29 PROCEDURE — 71260 CT THORAX DX C+: CPT | Mod: 26

## 2025-07-29 PROCEDURE — 70491 CT SOFT TISSUE NECK W/DYE: CPT | Mod: 26

## 2025-09-10 ENCOUNTER — NON-APPOINTMENT (OUTPATIENT)
Age: 70
End: 2025-09-10